# Patient Record
Sex: MALE | Race: BLACK OR AFRICAN AMERICAN | NOT HISPANIC OR LATINO | URBAN - METROPOLITAN AREA
[De-identification: names, ages, dates, MRNs, and addresses within clinical notes are randomized per-mention and may not be internally consistent; named-entity substitution may affect disease eponyms.]

---

## 2017-04-08 ENCOUNTER — INPATIENT (INPATIENT)
Facility: HOSPITAL | Age: 62
LOS: 1 days | Discharge: HOME | DRG: 300 | End: 2017-04-10
Attending: INTERNAL MEDICINE | Admitting: INTERNAL MEDICINE
Payer: COMMERCIAL

## 2017-04-08 VITALS — WEIGHT: 147.93 LBS | HEIGHT: 69 IN

## 2017-04-08 DIAGNOSIS — I24.9 ACUTE ISCHEMIC HEART DISEASE, UNSPECIFIED: ICD-10-CM

## 2017-04-08 DIAGNOSIS — I10 ESSENTIAL (PRIMARY) HYPERTENSION: ICD-10-CM

## 2017-04-08 DIAGNOSIS — E11.59 TYPE 2 DIABETES MELLITUS WITH OTHER CIRCULATORY COMPLICATIONS: ICD-10-CM

## 2017-04-08 LAB
ACETONE SERPL-MCNC: NEGATIVE — SIGNIFICANT CHANGE UP
ALBUMIN SERPL ELPH-MCNC: 4.7 G/DL — SIGNIFICANT CHANGE UP (ref 3.3–5.2)
ALP SERPL-CCNC: 106 U/L — SIGNIFICANT CHANGE UP (ref 40–120)
ALT FLD-CCNC: 28 U/L — SIGNIFICANT CHANGE UP
ANION GAP SERPL CALC-SCNC: 12 MMOL/L — SIGNIFICANT CHANGE UP (ref 5–17)
APTT BLD: 27.5 SEC — SIGNIFICANT CHANGE UP (ref 27.5–37.4)
AST SERPL-CCNC: 14 U/L — SIGNIFICANT CHANGE UP
BASOPHILS # BLD AUTO: 0 K/UL — SIGNIFICANT CHANGE UP (ref 0–0.2)
BASOPHILS NFR BLD AUTO: 0.2 % — SIGNIFICANT CHANGE UP (ref 0–2)
BILIRUB SERPL-MCNC: 1.2 MG/DL — SIGNIFICANT CHANGE UP (ref 0.4–2)
BUN SERPL-MCNC: 39 MG/DL — HIGH (ref 8–20)
CALCIUM SERPL-MCNC: 10.5 MG/DL — HIGH (ref 8.6–10.2)
CHLORIDE SERPL-SCNC: 91 MMOL/L — LOW (ref 98–107)
CK MB CFR SERPL CALC: 7.5 NG/ML — HIGH (ref 0–6.7)
CK SERPL-CCNC: 744 U/L — HIGH (ref 30–200)
CO2 SERPL-SCNC: 28 MMOL/L — SIGNIFICANT CHANGE UP (ref 22–29)
CREAT SERPL-MCNC: 1.54 MG/DL — HIGH (ref 0.5–1.3)
EOSINOPHIL # BLD AUTO: 0 K/UL — SIGNIFICANT CHANGE UP (ref 0–0.5)
EOSINOPHIL NFR BLD AUTO: 0.2 % — SIGNIFICANT CHANGE UP (ref 0–6)
GLUCOSE SERPL-MCNC: 476 MG/DL — HIGH (ref 70–115)
HCT VFR BLD CALC: 36.5 % — LOW (ref 42–52)
HGB BLD-MCNC: 12.7 G/DL — LOW (ref 14–18)
INR BLD: 0.99 RATIO — SIGNIFICANT CHANGE UP (ref 0.88–1.16)
LYMPHOCYTES # BLD AUTO: 1 K/UL — SIGNIFICANT CHANGE UP (ref 1–4.8)
LYMPHOCYTES # BLD AUTO: 25.1 % — SIGNIFICANT CHANGE UP (ref 20–55)
MCHC RBC-ENTMCNC: 28.7 PG — SIGNIFICANT CHANGE UP (ref 27–31)
MCHC RBC-ENTMCNC: 34.8 G/DL — SIGNIFICANT CHANGE UP (ref 32–36)
MCV RBC AUTO: 82.6 FL — SIGNIFICANT CHANGE UP (ref 80–94)
MONOCYTES # BLD AUTO: 0.4 K/UL — SIGNIFICANT CHANGE UP (ref 0–0.8)
MONOCYTES NFR BLD AUTO: 8.5 % — SIGNIFICANT CHANGE UP (ref 3–10)
NEUTROPHILS # BLD AUTO: 2.7 K/UL — SIGNIFICANT CHANGE UP (ref 1.8–8)
NEUTROPHILS NFR BLD AUTO: 65.8 % — SIGNIFICANT CHANGE UP (ref 37–73)
NT-PROBNP SERPL-SCNC: 35 PG/ML — SIGNIFICANT CHANGE UP (ref 0–300)
PLATELET # BLD AUTO: 174 K/UL — SIGNIFICANT CHANGE UP (ref 150–400)
POTASSIUM SERPL-MCNC: 5.2 MMOL/L — SIGNIFICANT CHANGE UP (ref 3.5–5.3)
POTASSIUM SERPL-SCNC: 5.2 MMOL/L — SIGNIFICANT CHANGE UP (ref 3.5–5.3)
PROT SERPL-MCNC: 8 G/DL — SIGNIFICANT CHANGE UP (ref 6.6–8.7)
PROTHROM AB SERPL-ACNC: 10.9 SEC — SIGNIFICANT CHANGE UP (ref 9.8–12.7)
RBC # BLD: 4.42 M/UL — LOW (ref 4.6–6.2)
RBC # FLD: 12.9 % — SIGNIFICANT CHANGE UP (ref 11–15.6)
SODIUM SERPL-SCNC: 131 MMOL/L — LOW (ref 135–145)
TROPONIN T SERPL-MCNC: <0.01 NG/ML — SIGNIFICANT CHANGE UP (ref 0–0.06)
WBC # BLD: 4.1 K/UL — LOW (ref 4.8–10.8)
WBC # FLD AUTO: 4.1 K/UL — LOW (ref 4.8–10.8)

## 2017-04-08 PROCEDURE — 71010: CPT | Mod: 26

## 2017-04-08 PROCEDURE — 99285 EMERGENCY DEPT VISIT HI MDM: CPT

## 2017-04-08 PROCEDURE — 99223 1ST HOSP IP/OBS HIGH 75: CPT

## 2017-04-08 RX ORDER — INSULIN LISPRO 100/ML
VIAL (ML) SUBCUTANEOUS AT BEDTIME
Qty: 0 | Refills: 0 | Status: DISCONTINUED | OUTPATIENT
Start: 2017-04-08 | End: 2017-04-10

## 2017-04-08 RX ORDER — ENOXAPARIN SODIUM 100 MG/ML
40 INJECTION SUBCUTANEOUS DAILY
Qty: 0 | Refills: 0 | Status: DISCONTINUED | OUTPATIENT
Start: 2017-04-08 | End: 2017-04-10

## 2017-04-08 RX ORDER — INSULIN LISPRO 100/ML
3 VIAL (ML) SUBCUTANEOUS
Qty: 0 | Refills: 0 | Status: DISCONTINUED | OUTPATIENT
Start: 2017-04-08 | End: 2017-04-10

## 2017-04-08 RX ORDER — GLUCAGON INJECTION, SOLUTION 0.5 MG/.1ML
1 INJECTION, SOLUTION SUBCUTANEOUS ONCE
Qty: 0 | Refills: 0 | Status: DISCONTINUED | OUTPATIENT
Start: 2017-04-08 | End: 2017-04-10

## 2017-04-08 RX ORDER — SODIUM CHLORIDE 9 MG/ML
1000 INJECTION INTRAMUSCULAR; INTRAVENOUS; SUBCUTANEOUS ONCE
Qty: 0 | Refills: 0 | Status: COMPLETED | OUTPATIENT
Start: 2017-04-08 | End: 2017-04-08

## 2017-04-08 RX ORDER — DEXTROSE 50 % IN WATER 50 %
1 SYRINGE (ML) INTRAVENOUS ONCE
Qty: 0 | Refills: 0 | Status: DISCONTINUED | OUTPATIENT
Start: 2017-04-08 | End: 2017-04-10

## 2017-04-08 RX ORDER — SODIUM CHLORIDE 9 MG/ML
1000 INJECTION, SOLUTION INTRAVENOUS
Qty: 0 | Refills: 0 | Status: DISCONTINUED | OUTPATIENT
Start: 2017-04-08 | End: 2017-04-10

## 2017-04-08 RX ORDER — INSULIN HUMAN 100 [IU]/ML
8 INJECTION, SOLUTION SUBCUTANEOUS ONCE
Qty: 0 | Refills: 0 | Status: COMPLETED | OUTPATIENT
Start: 2017-04-08 | End: 2017-04-08

## 2017-04-08 RX ORDER — ASPIRIN/CALCIUM CARB/MAGNESIUM 324 MG
325 TABLET ORAL ONCE
Qty: 0 | Refills: 0 | Status: COMPLETED | OUTPATIENT
Start: 2017-04-08 | End: 2017-04-08

## 2017-04-08 RX ORDER — ACETAMINOPHEN 500 MG
650 TABLET ORAL EVERY 6 HOURS
Qty: 0 | Refills: 0 | Status: DISCONTINUED | OUTPATIENT
Start: 2017-04-08 | End: 2017-04-10

## 2017-04-08 RX ORDER — SODIUM CHLORIDE 9 MG/ML
1000 INJECTION INTRAMUSCULAR; INTRAVENOUS; SUBCUTANEOUS
Qty: 0 | Refills: 0 | Status: DISCONTINUED | OUTPATIENT
Start: 2017-04-08 | End: 2017-04-10

## 2017-04-08 RX ORDER — DEXTROSE 50 % IN WATER 50 %
25 SYRINGE (ML) INTRAVENOUS ONCE
Qty: 0 | Refills: 0 | Status: DISCONTINUED | OUTPATIENT
Start: 2017-04-08 | End: 2017-04-10

## 2017-04-08 RX ORDER — SODIUM CHLORIDE 9 MG/ML
3 INJECTION INTRAMUSCULAR; INTRAVENOUS; SUBCUTANEOUS ONCE
Qty: 0 | Refills: 0 | Status: COMPLETED | OUTPATIENT
Start: 2017-04-08 | End: 2017-04-08

## 2017-04-08 RX ORDER — INSULIN LISPRO 100/ML
VIAL (ML) SUBCUTANEOUS
Qty: 0 | Refills: 0 | Status: DISCONTINUED | OUTPATIENT
Start: 2017-04-08 | End: 2017-04-10

## 2017-04-08 RX ORDER — ASPIRIN/CALCIUM CARB/MAGNESIUM 324 MG
81 TABLET ORAL DAILY
Qty: 0 | Refills: 0 | Status: DISCONTINUED | OUTPATIENT
Start: 2017-04-08 | End: 2017-04-10

## 2017-04-08 RX ORDER — DEXTROSE 50 % IN WATER 50 %
12.5 SYRINGE (ML) INTRAVENOUS ONCE
Qty: 0 | Refills: 0 | Status: DISCONTINUED | OUTPATIENT
Start: 2017-04-08 | End: 2017-04-10

## 2017-04-08 RX ORDER — INSULIN GLARGINE 100 [IU]/ML
20 INJECTION, SOLUTION SUBCUTANEOUS AT BEDTIME
Qty: 0 | Refills: 0 | Status: DISCONTINUED | OUTPATIENT
Start: 2017-04-08 | End: 2017-04-10

## 2017-04-08 RX ADMIN — Medication 325 MILLIGRAM(S): at 17:32

## 2017-04-08 RX ADMIN — SODIUM CHLORIDE 100 MILLILITER(S): 9 INJECTION INTRAMUSCULAR; INTRAVENOUS; SUBCUTANEOUS at 17:03

## 2017-04-08 RX ADMIN — SODIUM CHLORIDE 3 MILLILITER(S): 9 INJECTION INTRAMUSCULAR; INTRAVENOUS; SUBCUTANEOUS at 15:00

## 2017-04-08 RX ADMIN — INSULIN HUMAN 8 UNIT(S): 100 INJECTION, SOLUTION SUBCUTANEOUS at 17:03

## 2017-04-08 RX ADMIN — INSULIN GLARGINE 20 UNIT(S): 100 INJECTION, SOLUTION SUBCUTANEOUS at 22:52

## 2017-04-08 RX ADMIN — SODIUM CHLORIDE 1000 MILLILITER(S): 9 INJECTION INTRAMUSCULAR; INTRAVENOUS; SUBCUTANEOUS at 15:00

## 2017-04-08 NOTE — ED STATDOCS - DETAILS:
I, Gretel Linares, personally performed the services described in the documentation, reviewed the documentation recorded by the scribe in my presence and it accurately and completely records my words and action.

## 2017-04-08 NOTE — ED PROVIDER NOTE - OBJECTIVE STATEMENT
62 y/o male in ED c/o weakness with sob and cp intermittent x 2 wks.  pt states had has insulin changed 2 wks ago and BS has been elevated.  pt denies any fever, HA, n/v/d/abd pain.  tolerating PO.  no sick contacts or recent travel

## 2017-04-08 NOTE — H&P ADULT - PROBLEM SELECTOR PLAN 3
Monitor BP  He doesn't remember his outpatient medications.  Consider ace inhibitor as he has diabetes.

## 2017-04-08 NOTE — ED ADULT NURSE REASSESSMENT NOTE - NS ED NURSE REASSESS COMMENT FT1
Report received from off going RN, charting as noted. Patient A&Ox4, denies any pain or discomfort, cardiac monitor in place, NSR. Respirations even & unlabored. IV site C/D/I, patent. Negative s/s phlebitis or infiltration. Full ROM x4, will continue to monitor.

## 2017-04-08 NOTE — ED ADULT TRIAGE NOTE - CHIEF COMPLAINT QUOTE
patient arrived ambulatory to ED, awake, alert, and oriented times 3, breathing unlabored.  Patient was sent by Dr. Burns due to weakness and fatigue.  Patient states symptoms have been present for 2 weeks.  Patient also states SOB on exertion.  No complaints of pain at this time

## 2017-04-08 NOTE — H&P ADULT - HISTORY OF PRESENT ILLNESS
61 year old male with PMH HTN, DM. His Lantus was changed to Toujeo and his glucose levels were elevated. Patient was in his usual state of health when he developed left sided chest pain on exertion relieved at rest.  In the er his glucose was elevated and his cpk was elevated as well but troponin was marco antonio. EKG sinus non specific st changes. He does not remember his medications.

## 2017-04-08 NOTE — H&P ADULT - FAMILY HISTORY
Mother  Still living? Unknown  Family history of lung cancer, Age at diagnosis: Age Unknown     Father  Still living? Unknown  Family history of alcoholism, Age at diagnosis: Age Unknown

## 2017-04-08 NOTE — ED ADULT NURSE REASSESSMENT NOTE - NS ED NURSE REASSESS COMMENT FT1
pt resitng comfortably in cart. breathing si even and unlabored. pt admitted. pt awaiting bed availability on floor. will continu to monitor and reassess

## 2017-04-08 NOTE — ED STATDOCS - PROGRESS NOTE DETAILS
60 y/o M with hx of HTN presents to the ED c/o weakness/fatigue today. Pt notes he has a very high sugar level, 475 checked at PMD's office. He states his medication was changed 2 weeks ago because MD claimed that it was not helping him. He notes associated exertional dyspnea, L-sided chest congestion as well. Denies fever, chills, dysuria, pedal edema, calf tenderness. No further complaints at this time. Pt with fatigue high blood sugars, exertional dyspnea and sensation of congestion on L side of chest to be moved to main ED for complete evaluation by another provider.   PE: Lungs clear, no calf tenderness.  PMD is Dr. Pulliam.

## 2017-04-09 LAB
ANION GAP SERPL CALC-SCNC: 8 MMOL/L — SIGNIFICANT CHANGE UP (ref 5–17)
BUN SERPL-MCNC: 25 MG/DL — HIGH (ref 8–20)
CALCIUM SERPL-MCNC: 9.5 MG/DL — SIGNIFICANT CHANGE UP (ref 8.6–10.2)
CHLORIDE SERPL-SCNC: 100 MMOL/L — SIGNIFICANT CHANGE UP (ref 98–107)
CHOLEST SERPL-MCNC: 173 MG/DL — SIGNIFICANT CHANGE UP (ref 110–199)
CK MB CFR SERPL CALC: 5.1 NG/ML — SIGNIFICANT CHANGE UP (ref 0–6.7)
CK MB CFR SERPL CALC: 5.5 NG/ML — SIGNIFICANT CHANGE UP (ref 0–6.7)
CK SERPL-CCNC: 415 U/L — HIGH (ref 30–200)
CK SERPL-CCNC: 484 U/L — HIGH (ref 30–200)
CO2 SERPL-SCNC: 29 MMOL/L — SIGNIFICANT CHANGE UP (ref 22–29)
CREAT SERPL-MCNC: 0.99 MG/DL — SIGNIFICANT CHANGE UP (ref 0.5–1.3)
GLUCOSE SERPL-MCNC: 243 MG/DL — HIGH (ref 70–115)
HBA1C BLD-MCNC: 12.9 % — HIGH (ref 4–5.6)
HCT VFR BLD CALC: 33.8 % — LOW (ref 42–52)
HDLC SERPL-MCNC: 71 MG/DL — SIGNIFICANT CHANGE UP
HGB BLD-MCNC: 11.6 G/DL — LOW (ref 14–18)
LIPID PNL WITH DIRECT LDL SERPL: 91 MG/DL — SIGNIFICANT CHANGE UP
MAGNESIUM SERPL-MCNC: 2.1 MG/DL — SIGNIFICANT CHANGE UP (ref 1.8–2.5)
MCHC RBC-ENTMCNC: 28.6 PG — SIGNIFICANT CHANGE UP (ref 27–31)
MCHC RBC-ENTMCNC: 34.3 G/DL — SIGNIFICANT CHANGE UP (ref 32–36)
MCV RBC AUTO: 83.3 FL — SIGNIFICANT CHANGE UP (ref 80–94)
PHOSPHATE SERPL-MCNC: 2.6 MG/DL — SIGNIFICANT CHANGE UP (ref 2.4–4.7)
PLATELET # BLD AUTO: 151 K/UL — SIGNIFICANT CHANGE UP (ref 150–400)
POTASSIUM SERPL-MCNC: 3.9 MMOL/L — SIGNIFICANT CHANGE UP (ref 3.5–5.3)
POTASSIUM SERPL-SCNC: 3.9 MMOL/L — SIGNIFICANT CHANGE UP (ref 3.5–5.3)
RBC # BLD: 4.06 M/UL — LOW (ref 4.6–6.2)
RBC # FLD: 13 % — SIGNIFICANT CHANGE UP (ref 11–15.6)
SODIUM SERPL-SCNC: 137 MMOL/L — SIGNIFICANT CHANGE UP (ref 135–145)
TOTAL CHOLESTEROL/HDL RATIO MEASUREMENT: 2 RATIO — LOW (ref 3.4–9.6)
TRIGL SERPL-MCNC: 57 MG/DL — SIGNIFICANT CHANGE UP (ref 10–200)
TSH SERPL-MCNC: 1.05 UIU/ML — SIGNIFICANT CHANGE UP (ref 0.27–4.2)
WBC # BLD: 3.1 K/UL — LOW (ref 4.8–10.8)
WBC # FLD AUTO: 3.1 K/UL — LOW (ref 4.8–10.8)

## 2017-04-09 PROCEDURE — 93306 TTE W/DOPPLER COMPLETE: CPT | Mod: 26

## 2017-04-09 PROCEDURE — 99233 SBSQ HOSP IP/OBS HIGH 50: CPT

## 2017-04-09 RX ADMIN — Medication 2: at 09:30

## 2017-04-09 RX ADMIN — ENOXAPARIN SODIUM 40 MILLIGRAM(S): 100 INJECTION SUBCUTANEOUS at 12:23

## 2017-04-09 RX ADMIN — Medication 4: at 21:42

## 2017-04-09 RX ADMIN — Medication 3 UNIT(S): at 09:30

## 2017-04-09 RX ADMIN — INSULIN GLARGINE 20 UNIT(S): 100 INJECTION, SOLUTION SUBCUTANEOUS at 21:28

## 2017-04-09 RX ADMIN — SODIUM CHLORIDE 100 MILLILITER(S): 9 INJECTION INTRAMUSCULAR; INTRAVENOUS; SUBCUTANEOUS at 06:24

## 2017-04-09 RX ADMIN — Medication 81 MILLIGRAM(S): at 12:24

## 2017-04-10 VITALS
DIASTOLIC BLOOD PRESSURE: 72 MMHG | SYSTOLIC BLOOD PRESSURE: 128 MMHG | OXYGEN SATURATION: 98 % | TEMPERATURE: 98 F | HEART RATE: 90 BPM | RESPIRATION RATE: 16 BRPM

## 2017-04-10 PROCEDURE — 78452 HT MUSCLE IMAGE SPECT MULT: CPT | Mod: 26

## 2017-04-10 PROCEDURE — 71045 X-RAY EXAM CHEST 1 VIEW: CPT

## 2017-04-10 PROCEDURE — 80061 LIPID PANEL: CPT

## 2017-04-10 PROCEDURE — 85027 COMPLETE CBC AUTOMATED: CPT

## 2017-04-10 PROCEDURE — 83036 HEMOGLOBIN GLYCOSYLATED A1C: CPT

## 2017-04-10 PROCEDURE — 83880 ASSAY OF NATRIURETIC PEPTIDE: CPT

## 2017-04-10 PROCEDURE — 96374 THER/PROPH/DIAG INJ IV PUSH: CPT

## 2017-04-10 PROCEDURE — 80053 COMPREHEN METABOLIC PANEL: CPT

## 2017-04-10 PROCEDURE — 93005 ELECTROCARDIOGRAM TRACING: CPT

## 2017-04-10 PROCEDURE — 80048 BASIC METABOLIC PNL TOTAL CA: CPT

## 2017-04-10 PROCEDURE — 84100 ASSAY OF PHOSPHORUS: CPT

## 2017-04-10 PROCEDURE — 84443 ASSAY THYROID STIM HORMONE: CPT

## 2017-04-10 PROCEDURE — 85610 PROTHROMBIN TIME: CPT

## 2017-04-10 PROCEDURE — 78452 HT MUSCLE IMAGE SPECT MULT: CPT

## 2017-04-10 PROCEDURE — 93017 CV STRESS TEST TRACING ONLY: CPT

## 2017-04-10 PROCEDURE — 36415 COLL VENOUS BLD VENIPUNCTURE: CPT

## 2017-04-10 PROCEDURE — 93016 CV STRESS TEST SUPVJ ONLY: CPT

## 2017-04-10 PROCEDURE — 82550 ASSAY OF CK (CPK): CPT

## 2017-04-10 PROCEDURE — 85730 THROMBOPLASTIN TIME PARTIAL: CPT

## 2017-04-10 PROCEDURE — A9500: CPT

## 2017-04-10 PROCEDURE — 93018 CV STRESS TEST I&R ONLY: CPT

## 2017-04-10 PROCEDURE — 82009 KETONE BODYS QUAL: CPT

## 2017-04-10 PROCEDURE — 99285 EMERGENCY DEPT VISIT HI MDM: CPT | Mod: 25

## 2017-04-10 PROCEDURE — 83735 ASSAY OF MAGNESIUM: CPT

## 2017-04-10 PROCEDURE — 99239 HOSP IP/OBS DSCHRG MGMT >30: CPT

## 2017-04-10 PROCEDURE — 84484 ASSAY OF TROPONIN QUANT: CPT

## 2017-04-10 PROCEDURE — 93306 TTE W/DOPPLER COMPLETE: CPT

## 2017-04-10 PROCEDURE — 82553 CREATINE MB FRACTION: CPT

## 2017-04-10 RX ORDER — INSULIN LISPRO 100/ML
3 VIAL (ML) SUBCUTANEOUS
Qty: 1 | Refills: 0 | OUTPATIENT
Start: 2017-04-10 | End: 2017-05-10

## 2017-04-10 RX ORDER — ASPIRIN/CALCIUM CARB/MAGNESIUM 324 MG
1 TABLET ORAL
Qty: 0 | Refills: 0 | COMMUNITY
Start: 2017-04-10

## 2017-04-10 RX ORDER — ATORVASTATIN CALCIUM 80 MG/1
1 TABLET, FILM COATED ORAL
Qty: 30 | Refills: 0 | OUTPATIENT
Start: 2017-04-10 | End: 2017-05-10

## 2017-04-10 RX ORDER — LOSARTAN POTASSIUM 100 MG/1
1 TABLET, FILM COATED ORAL
Qty: 30 | Refills: 0 | OUTPATIENT
Start: 2017-04-10 | End: 2017-05-10

## 2017-04-10 RX ORDER — DEXTROSE 50 % IN WATER 50 %
12.5 SYRINGE (ML) INTRAVENOUS ONCE
Qty: 0 | Refills: 0 | Status: COMPLETED | OUTPATIENT
Start: 2017-04-10 | End: 2017-04-10

## 2017-04-10 RX ORDER — INSULIN GLARGINE 100 [IU]/ML
20 INJECTION, SOLUTION SUBCUTANEOUS
Qty: 0 | Refills: 0 | COMMUNITY

## 2017-04-10 RX ADMIN — Medication 12.5 GRAM(S): at 09:32

## 2017-04-10 RX ADMIN — Medication 3 UNIT(S): at 13:29

## 2017-04-10 RX ADMIN — Medication 81 MILLIGRAM(S): at 11:36

## 2017-04-10 NOTE — CONSULT NOTE ADULT - PROBLEM SELECTOR RECOMMENDATION 9
Patient's symptoms concerning for cardiac etiology. CPK elevated but troponins negative. Will repeat. Aspirin 81 mg po. Will most likely plan for nuclear stress test on monday if troponins continue to be negative.
insulin   diabetes education

## 2017-04-10 NOTE — DISCHARGE NOTE ADULT - SECONDARY DIAGNOSIS.
Essential hypertension Type 2 diabetes mellitus with other circulatory complication Mixed hyperlipidemia

## 2017-04-10 NOTE — DISCHARGE NOTE ADULT - PLAN OF CARE
Stable cardiac function Follow up with Primary Care Add cozaar  Follow up Primary Care Resume Toujeo 20 units at 10 pm, add Humalog 3 units pre meals, watch for hypoglycemia,  Follow up with Dr Purcell. Ad lipitor 10 mg daily  Assess liver enzymes in one month,  watch for muscle aches.

## 2017-04-10 NOTE — PROGRESS NOTE ADULT - PROBLEM SELECTOR PROBLEM 2
Type 2 diabetes mellitus with other circulatory complication
Essential hypertension
Type 2 diabetes mellitus with other circulatory complication

## 2017-04-10 NOTE — PROGRESS NOTE ADULT - PROBLEM SELECTOR PLAN 2
Hypoglycemic today  DM education
stable.
Continue Dm education  Lantus and Humalog  Consult Endocrinology

## 2017-04-10 NOTE — DISCHARGE NOTE ADULT - CARE PLAN
Principal Discharge DX:	ACS (acute coronary syndrome)  Goal:	Stable cardiac function  Instructions for follow-up, activity and diet:	Follow up with Primary Care  Secondary Diagnosis:	Essential hypertension  Instructions for follow-up, activity and diet:	Add cozaar  Follow up Primary Care  Secondary Diagnosis:	Type 2 diabetes mellitus with other circulatory complication  Instructions for follow-up, activity and diet:	Resume Toujeo 20 units at 10 pm, add Humalog 3 units pre meals, watch for hypoglycemia,  Follow up with Dr Purcell.  Secondary Diagnosis:	Mixed hyperlipidemia  Instructions for follow-up, activity and diet:	Ad lipitor 10 mg daily  Assess liver enzymes in one month,  watch for muscle aches.

## 2017-04-10 NOTE — DISCHARGE NOTE ADULT - PATIENT PORTAL LINK FT
“You can access the Follow[employer] Patient Portal, offered by [facility], by registering with the following website: [email or web address]”

## 2017-04-10 NOTE — DISCHARGE NOTE ADULT - CARE PROVIDER_API CALL
Salinas Purcell), Internal Medicine  [address] [place] [address]  Phone: [phone]  Fax: [phone]    Dr Brian  Phone: (   )    -  Fax: (   )    -

## 2017-04-10 NOTE — DISCHARGE NOTE ADULT - MEDICATION SUMMARY - MEDICATIONS TO TAKE
I will START or STAY ON the medications listed below when I get home from the hospital:    Patient was admitted to [facility] on 4/8/2017 and discharged on 4/10/2017. He is stable to return to work.  -- Indication: For ACS (acute coronary syndrome)    relion glucose meter  -- Indication: For Type 2 diabetes mellitus with other circulatory complication    relion glucose test strips  -- Indication: For Type 2 diabetes mellitus with other circulatory complication    relion lancets  -- Indication: For Type 2 diabetes mellitus with other circulatory complication    glucose tablets prn hypoglycemia  -- Indication: For Type 2 diabetes mellitus with other circulatory complication    aspirin 81 mg oral tablet, chewable  -- 1 tab(s) by mouth once a day  -- Indication: For ACS (acute coronary syndrome)    Cozaar 25 mg oral tablet  -- 1 tab(s) by mouth once a day  -- Do not take this drug if you are pregnant.  It is very important that you take or use this exactly as directed.  Do not skip doses or discontinue unless directed by your doctor.  Some non-prescription drugs may aggravate your condition.  Read all labels carefully.  If a warning appears, check with your doctor before taking.    -- Indication: For Proteinuria    Toujeo SoloStar 300 units/mL subcutaneous solution  -- 20 unit(s) subcutaneous once a day (at bedtime)  -- Indication: For Type 2 diabetes mellitus with other circulatory complication    HumaLOG KwikPen 100 units/mL subcutaneous solution  -- 3 unit(s) subcutaneous 3 times a day (before meals)  -- Indication: For Type 2 diabetes mellitus with other circulatory complication    atorvastatin 10 mg oral tablet  -- 1 tab(s) by mouth once a day  -- Avoid grapefruit and grapefruit juice while taking this medication.  Do not take this drug if you are pregnant.  It is very important that you take or use this exactly as directed.  Do not skip doses or discontinue unless directed by your doctor.  Obtain medical advice before taking any non-prescription drugs as some may affect the action of this medication.  Take with food or milk.    -- Indication: For Hyperlycemia

## 2017-04-10 NOTE — DISCHARGE NOTE ADULT - HOSPITAL COURSE
61 year old male with PMH HTN, DM. His Lantus was changed to Toujeo and his glucose levels were elevated. Patient was in his usual state of health when he developed left sided chest pain on exertion relieved at rest.  In the er his glucose was elevated and his cpk was elevated as well but troponin was marco antonio. EKG sinus non specific st changes. He does not remember his medications.  Patient was admitted and educated on diabetes. I taught him the importance of humalog premeal in addition to his Toujeo basal.  Target glucose between 100-150. He has an appointment with Dr Purcell.  His stress test was normal and the patient was cleared for discharge with close outpatient follow up.

## 2017-04-10 NOTE — PROGRESS NOTE ADULT - SUBJECTIVE AND OBJECTIVE BOX
CC: chest pain    INTERVAL HISTORY: Improved symptoms of CP.     MEDICATIONS  (STANDING):  sodium chloride 0.9%. 1000milliLiter(s) IV Continuous <Continuous>  insulin glargine Injectable (LANTUS) 20Unit(s) SubCutaneous at bedtime  insulin lispro Injectable (HumaLOG) 3Unit(s) SubCutaneous three times a day before meals  insulin lispro (HumaLOG) corrective regimen sliding scale  SubCutaneous three times a day before meals  insulin lispro (HumaLOG) corrective regimen sliding scale  SubCutaneous at bedtime  dextrose 5%. 1000milliLiter(s) IV Continuous <Continuous>  dextrose 50% Injectable 12.5Gram(s) IV Push once  dextrose 50% Injectable 25Gram(s) IV Push once  dextrose 50% Injectable 25Gram(s) IV Push once  enoxaparin Injectable 40milliGRAM(s) SubCutaneous daily  aspirin  chewable 81milliGRAM(s) Oral daily    ROS: All others negative     PHYSICAL EXAM:  T(C): 36.9, Max: 36.9 (04-08 @ 20:54)  HR: 87 (80 - 106)  BP: 110/65 (103/56 - 135/81)  RR: 18 (18 - 20)  SpO2: 100% (98% - 100%)  Wt(kg): --  I&O's Summary    I & Os for current day (as of 09 Apr 2017 11:58)  =============================================  IN: 1000 ml / OUT: 0 ml / NET: 1000 ml      Appearance: Normal	  HEENT:   Normal oral mucosa, PERRL, EOMI	  Lymphatic: No lymphadenopathy  Cardiovascular: Normal S1 S2, No JVD, No murmurs, No edema  Respiratory: Lungs clear to auscultation	  Psychiatry: A & O x 3, Mood & affect appropriate  Gastrointestinal:  Soft, Non-tender, + BS	  Skin: No rashes, No ecchymoses, No cyanosis  Neurologic: Non-focal  Extremities: Normal range of motion, No clubbing, cyanosis or edema  Vascular: Peripheral pulses palpable 2+ bilaterally        LABS:	 	                        11.6   3.1   )-----------( 151      ( 09 Apr 2017 06:35 )             33.8     04-09    137  |  100  |  25.0<H>  ----------------------------<  243<H>  3.9   |  29.0  |  0.99    Ca    9.5      09 Apr 2017 06:35  Phos  2.6     04-09  Mg     2.1     04-09    TPro  8.0  /  Alb  4.7  /  TBili  1.2  /  DBili  x   /  AST  14  /  ALT  28  /  AlkPhos  106  04-08
REYMUNDO RUGGIERO     Chief Complaint: Patient is a 61y old  Male who presents with a chief complaint of Chest Pain (08 Apr 2017 17:45)      PAST MEDICAL & SURGICAL HISTORY:  Essential hypertension  Type 2 diabetes mellitus with other circulatory complication  No significant past surgical history      HPI/OVERNIGHT EVENTS: Patient doing well. Stress test in am.    MEDICATIONS  (STANDING):  sodium chloride 0.9%. 1000milliLiter(s) IV Continuous <Continuous>  insulin glargine Injectable (LANTUS) 20Unit(s) SubCutaneous at bedtime  insulin lispro Injectable (HumaLOG) 3Unit(s) SubCutaneous three times a day before meals  insulin lispro (HumaLOG) corrective regimen sliding scale  SubCutaneous three times a day before meals  insulin lispro (HumaLOG) corrective regimen sliding scale  SubCutaneous at bedtime  dextrose 5%. 1000milliLiter(s) IV Continuous <Continuous>  dextrose 50% Injectable 12.5Gram(s) IV Push once  dextrose 50% Injectable 25Gram(s) IV Push once  dextrose 50% Injectable 25Gram(s) IV Push once  enoxaparin Injectable 40milliGRAM(s) SubCutaneous daily  aspirin  chewable 81milliGRAM(s) Oral daily      Vital Signs Last 24 Hrs  T(C): 36.9, Max: 36.9 (04-08 @ 20:54)  T(F): 98.5, Max: 98.5 (04-08 @ 20:54)  HR: 87 (80 - 87)  BP: 110/65 (103/56 - 135/81)  BP(mean): --  RR: 18 (18 - 20)  SpO2: 100% (98% - 100%)    PHYSICAL EXAM:  Constitutional: NAD, well-groomed, well-developed  HEENT: PERRLA, EOMI, Normal Hearing, MMM  Neck: No LAD, No JVD  Back: Normal spine flexure, No CVA tenderness  Respiratory: CTAB Cardiovascular: S1 and S2, RRR, no M/G/R  Gastrointestinal: BS+, soft, NT/ND  Extremities: No peripheral edema  Vascular: 2+ peripheral pulses  Neurological: A/O x 3, no focal deficits  Psychiatric: Normal mood, normal affect  Musculoskeletal: 5/5 strength b/l upper and lower extremities  Skin: No rashes    CAPILLARY BLOOD GLUCOSE  95 (09 Apr 2017 12:31)  166 (09 Apr 2017 08:32)  121 (08 Apr 2017 22:01)  121 (08 Apr 2017 18:36)  396 (08 Apr 2017 14:59)    LABS:                        11.6   3.1   )-----------( 151      ( 09 Apr 2017 06:35 )             33.8     04-09    137  |  100  |  25.0<H>  ----------------------------<  243<H>  3.9   |  29.0  |  0.99    Ca    9.5      09 Apr 2017 06:35  Phos  2.6     04-09  Mg     2.1     04-09    TPro  8.0  /  Alb  4.7  /  TBili  1.2  /  DBili  x   /  AST  14  /  ALT  28  /  AlkPhos  106  04-08    PT/INR - ( 08 Apr 2017 15:23 )   PT: 10.9 sec;   INR: 0.99 ratio         PTT - ( 08 Apr 2017 15:23 )  PTT:27.5 sec      RADIOLOGY & ADDITIONAL TESTS:
REYMUNDO RUGGIERO     Chief Complaint: Patient is a 61y old  Male who presents with a chief complaint of Chest Pain (08 Apr 2017 17:45)      PAST MEDICAL & SURGICAL HISTORY:  Essential hypertension  Type 2 diabetes mellitus with other circulatory complication  No significant past surgical history      HPI/OVERNIGHT EVENTS: Stress test today    MEDICATIONS  (STANDING):  sodium chloride 0.9%. 1000milliLiter(s) IV Continuous <Continuous>  insulin glargine Injectable (LANTUS) 20Unit(s) SubCutaneous at bedtime  insulin lispro Injectable (HumaLOG) 3Unit(s) SubCutaneous three times a day before meals  insulin lispro (HumaLOG) corrective regimen sliding scale  SubCutaneous three times a day before meals  insulin lispro (HumaLOG) corrective regimen sliding scale  SubCutaneous at bedtime  dextrose 5%. 1000milliLiter(s) IV Continuous <Continuous>  dextrose 50% Injectable 12.5Gram(s) IV Push once  dextrose 50% Injectable 25Gram(s) IV Push once  dextrose 50% Injectable 25Gram(s) IV Push once  enoxaparin Injectable 40milliGRAM(s) SubCutaneous daily  aspirin  chewable 81milliGRAM(s) Oral daily      Vital Signs Last 24 Hrs  T(C): 36.9, Max: 37 (04-10 @ 05:13)  T(F): 98.4, Max: 98.6 (04-10 @ 05:13)  HR: 91 (80 - 94)  BP: 100/70 (100/70 - 144/72)  BP(mean): --  RR: 16 (14 - 20)  SpO2: 98% (96% - 100%)    PHYSICAL EXAM:  Constitutional: NAD, well-groomed, well-developed  HEENT: PERRLA, EOMI, Normal Hearing, MMM  Neck: No LAD, No JVD  Back: Normal spine flexure, No CVA tenderness  Respiratory: CTAB Cardiovascular: S1 and S2, RRR, no M/G/R  Gastrointestinal: BS+, soft, NT/ND  Extremities: No peripheral edema  Vascular: 2+ peripheral pulses  Neurological: A/O x 3, no focal deficits  Psychiatric: Normal mood, normal affect  Musculoskeletal: 5/5 strength b/l upper and lower extremities  Skin: No rashes    CAPILLARY BLOOD GLUCOSE  92 (10 Apr 2017 11:05)  126 (10 Apr 2017 09:49)  68 (10 Apr 2017 09:25)  76 (10 Apr 2017 06:57)  309 (09 Apr 2017 21:22)  285 (09 Apr 2017 17:31)  95 (09 Apr 2017 12:31)  166 (09 Apr 2017 08:32)  121 (08 Apr 2017 22:01)  121 (08 Apr 2017 18:36)  396 (08 Apr 2017 14:59)    LABS:                        11.6   3.1   )-----------( 151      ( 09 Apr 2017 06:35 )             33.8     04-09    137  |  100  |  25.0<H>  ----------------------------<  243<H>  3.9   |  29.0  |  0.99    Ca    9.5      09 Apr 2017 06:35  Phos  2.6     04-09  Mg     2.1     04-09    TPro  8.0  /  Alb  4.7  /  TBili  1.2  /  DBili  x   /  AST  14  /  ALT  28  /  AlkPhos  106  04-08    PT/INR - ( 08 Apr 2017 15:23 )   PT: 10.9 sec;   INR: 0.99 ratio         PTT - ( 08 Apr 2017 15:23 )  PTT:27.5 sec      RADIOLOGY & ADDITIONAL TESTS:

## 2017-04-10 NOTE — CONSULT NOTE ADULT - ASSESSMENT
T2DM - uncontrolled [id number] 12.9 - will continue current RX for now as cardiac work up in progress  switch to Toujeo not likley casue of symptomatology   with Lantus and Humalog- pt will see us as outpt

## 2017-04-10 NOTE — PROGRESS NOTE ADULT - PROBLEM SELECTOR PLAN 1
Stress test today
Symptoms unclear. No EKG changes and normal cardiac enzymes. plan for nuclear stress in am. npo after midnight.
Stress test in am

## 2017-04-10 NOTE — CONSULT NOTE ADULT - SUBJECTIVE AND OBJECTIVE BOX
HPI:  61 year old male with PMH HTN, DM x 22 years  pt had noted onset of hyperglcyemia about 2 months when hisLantus was changed to Toujeo. Pt noted onset of some shortness of breath which he attributed to his high BS .  Patient was in his usual state of health when he developed left sided chest pain on exertion relieved at rest.  In the er his glucose was elevated and his cpk was elevated as well but troponin was marco antonio. EKG sinus non specific st changes. He does not remember his medications. (08 Apr 2017 17:45)      PAST MEDICAL & SURGICAL HISTORY:  Essential hypertension  Type 2 diabetes mellitus with other circulatory complication  No significant past surgical history      FAMILY HISTORY:  Family history of alcoholism (Father)  Family history of lung cancer (Mother)  Mom and maternal uncles with DM       SOCIAL HISTORY: former smoker and alcohol use but quit several years ago    no illicit drugs   No exposure to chemcial or radiation  Born and raised in [facility]     REVIEW OF SYSTEMS:    Constitutional: No fever, no chills, no change in weight.    Eyes: No eye swelling,no  blurry vision, no redness, no loss of vision.    Neck: No neck pain, no change in voice.    Lungs: No shortness of breath, no wheezing, no cough    CV: No chest pain, no palpitations, no pain with walking.    GI: No nausea, no vomiting, no constipation, no diarrhea, no abdominal pain    : No urinary frequency, no blood in urine, no urinary burning, no difficulty voiding.    Musculoskeletal: No muscle pain, no joint pain, no swelling.    Skin: No rash, no infections.    Neurologic: No headaches, no weakness, no burning or pain in feet, no tremor.    Endocrine: No heat intolerance, no cold intolerance, no increased sweating, no shakiness between meals.    Psych: No depression, no anxiety, no trouble concentrating    MEDICATIONS  (STANDING):  sodium chloride 0.9%. 1000milliLiter(s) IV Continuous <Continuous>  insulin glargine Injectable (LANTUS) 20Unit(s) SubCutaneous at bedtime  insulin lispro Injectable (HumaLOG) 3Unit(s) SubCutaneous three times a day before meals  insulin lispro (HumaLOG) corrective regimen sliding scale  SubCutaneous three times a day before meals  insulin lispro (HumaLOG) corrective regimen sliding scale  SubCutaneous at bedtime  dextrose 5%. 1000milliLiter(s) IV Continuous <Continuous>  dextrose 50% Injectable 12.5Gram(s) IV Push once  dextrose 50% Injectable 25Gram(s) IV Push once  dextrose 50% Injectable 25Gram(s) IV Push once  enoxaparin Injectable 40milliGRAM(s) SubCutaneous daily  aspirin  chewable 81milliGRAM(s) Oral daily    MEDICATIONS  (PRN):  dextrose Gel 1Dose(s) Oral once PRN Blood Glucose LESS THAN 70 milliGRAM(s)/deciliter  glucagon  Injectable 1milliGRAM(s) IntraMuscular once PRN Glucose LESS THAN 70 milligrams/deciliter  acetaminophen   Tablet. 650milliGRAM(s) Oral every 6 hours PRN Mild Pain (1 - 3)      Allergies    No Known Allergies    Intolerances          CAPILLARY BLOOD GLUCOSE  92 (10 Apr 2017 11:05)      PHYSICAL EXAM:    Vital Signs Last 24 Hrs  T(C): 36.8, Max: 37 (04-10 @ 05:13)  T(F): 98.2, Max: 98.6 (04-10 @ 05:13)  HR: 90 (85 - 94)  BP: 128/72 (100/70 - 144/72)  BP(mean): --  RR: 16 (16 - 16)  SpO2: 98% (96% - 98%)    General appearance: Well developed, well nourished.    Eyes: Pupils equal and reactive to light. EOM full. No exophthalmos.    Neck: Trachea midline. No thyroid enlargement.    Lungs: Normal respiratory excursion. Lungs clear.    CV: Regular cardiac rhythm. No murmur. Carotid and pedal pulses intact.      Musculoskeletal: No cyanosis, clubbing, or edema. No pedal lesions.    Skin: Warm and moist. No rash. No acanthosis.    Neuro: Cranial nerves intact. Normal motor and sensory function. DTR's normal.    Psych: Normal affect, good judgement.            LABS:                        11.6   3.1   )-----------( 151      ( 09 Apr 2017 06:35 )             33.8     04-09    137  |  100  |  25.0<H>  ----------------------------<  243<H>  3.9   |  29.0  |  0.99    Ca    9.5      09 Apr 2017 06:35  Phos  2.6     04-09  Mg     2.1     04-09          Hemoglobin A1C, Whole Blood: 12.9 % (04-09 @ 06:35)        CAPILLARY BLOOD GLUCOSE  92 (10 Apr 2017 11:05)  126 (10 Apr 2017 09:49)  68 (10 Apr 2017 09:25)  76 (10 Apr 2017 06:57)  309 (09 Apr 2017 21:22)  285 (09 Apr 2017 17:31)  95 (09 Apr 2017 12:31)  166 (09 Apr 2017 08:32)  121 (08 Apr 2017 22:01)  121 (08 Apr 2017 18:36)  396 (08 Apr 2017 14:59)      RADIOLOGY & ADDITIONAL STUDIES:
CC: Chest pain    HPI: Patient is a  61y Male with history of hypertension, DMII presenting with chest pain. Patient has had recent change in his DM medications. Has been noticing increased symptoms of chest pain with and without exertion. Denies n/v/d. No previous cardiac workup.   Has had mild shortness of breath.       PAST MEDICAL & SURGICAL HISTORY:  Essential hypertension  Type 2 diabetes mellitus with other circulatory complication  No significant past surgical history    FAMILY HISTORY:  Family history of alcoholism (Father)  Family history of lung cancer (Mother)    SOCIAL HISTORY: no EtOH, drugs or tobacco    MEDICATIONS  (STANDING):  sodium chloride 0.9%. 1000milliLiter(s) IV Continuous <Continuous>  insulin glargine Injectable (LANTUS) 20Unit(s) SubCutaneous at bedtime  insulin lispro Injectable (HumaLOG) 3Unit(s) SubCutaneous three times a day before meals  insulin lispro (HumaLOG) corrective regimen sliding scale  SubCutaneous three times a day before meals  insulin lispro (HumaLOG) corrective regimen sliding scale  SubCutaneous at bedtime  dextrose 5%. 1000milliLiter(s) IV Continuous <Continuous>  dextrose 50% Injectable 12.5Gram(s) IV Push once  dextrose 50% Injectable 25Gram(s) IV Push once  dextrose 50% Injectable 25Gram(s) IV Push once  enoxaparin Injectable 40milliGRAM(s) SubCutaneous daily    ROS: All others negative    PHYSICAL EXAM:  Vital Signs Last 24 Hrs  T(C): 36.9, Max: 36.9 (04-08 @ 20:54)  T(F): 98.5, Max: 98.5 (04-08 @ 20:54)  HR: 85 (85 - 106)  BP: 135/81 (113/71 - 135/81)  BP(mean): --  RR: 18 (18 - 18)  SpO2: 98% (98% - 99%)  I&O's Summary    Appearance: Normal	  HEENT:   Normal oral mucosa, PERRL, EOMI	  Lymphatic: No lymphadenopathy  Cardiovascular: Normal S1 S2, No JVD, No murmurs, No edema  Respiratory: Lungs clear to auscultation	  Psychiatry: A & O x 3, Mood & affect appropriate  Gastrointestinal:  Soft, Non-tender, + BS	  Skin: No rashes, No ecchymoses, No cyanosis  Neurologic: Non-focal  Extremities: Normal range of motion, No clubbing, cyanosis or edema  Vascular: Peripheral pulses palpable 2+ bilaterally    ECG:Sinus with LVH.   LABS:                        12.7   4.1   )-----------( 174      ( 08 Apr 2017 15:23 )             36.5     04-08    131<L>  |  91<L>  |  39.0<H>  ----------------------------<  476<H>  5.2   |  28.0  |  1.54<H>    Ca    10.5<H>      08 Apr 2017 15:23    TPro  8.0  /  Alb  4.7  /  TBili  1.2  /  DBili  x   /  AST  14  /  ALT  28  /  AlkPhos  106  04-08    PT/INR - ( 08 Apr 2017 15:23 )   PT: 10.9 sec;   INR: 0.99 ratio         PTT - ( 08 Apr 2017 15:23 )  PTT:27.5 sec  CARDIAC MARKERS ( 08 Apr 2017 15:23 )  x     / <0.01 ng/mL / 744 U/L / x     / 7.5 ng/mL      RADIOLOGY & ADDITIONAL STUDIES: X-ray - normal.

## 2018-01-15 NOTE — ED ADULT TRIAGE NOTE - ARRIVAL FROM
Indication:   CHEST PAIN

 

 CONCLUSIONS

 The left ventricular systolic function is normal with an estimated ejection fraction in the range of
 55-60%. 

 Normal left ventricular size. 

 Moderate concentric left ventricular hypertrophy. 

 No regional wall motion abnormalities are present. 

 Trace mitral valve regurgitation. 

 There is trace tricuspid valve regurgitation. 

 The estimated pulmonary arterial pressure is 43.4 mmHg. 

 Trivial pulmonary valve regurgitation. 

 

 BP:  167   / 87      HR: 95                       Rhythm:           Sinus

 

 MEASUREMENTS  (Male / Female) Normal Values       Technical Quality:Good

 2D ECHO

 LV Diastolic Diameter PLAX        3.9 cm                4.2 - 5.9 / 3.9 - 5.3 cm

 LV Systolic Diameter PLAX         2.8 cm                

 IVS Diastolic Thickness           1.8 cm                0.6 - 1.0 / 0.6 - 0.9 cm

 LVPW Diastolic Thickness          1.7 cm                0.6 - 1.0 / 0.6 - 0.9 cm

 LV Relative Wall Thickness        0.9                   

 RV Internal Dim ED PLAX           2.1 cm                

 LVOT Diameter                     2.0 cm                

 LA Systolic Diameter LX           3.9 cm                3.0 - 4.0 / 2.7 - 3.8 cm

 LV Ejection Fraction MOD 4C       60.3 %                

 LV Cardiac Index MOD 4C           4120.1 cm/minm     

 LV Ejection Fraction 4C AL        61.6 %                

 LV Cardiac Index 4C AL            4367.8 cm/minm     

 

 M-MODE

 Aortic Root Diameter MM           2.8 cm                

 LA Systolic Diameter MM           3.6 cm                

 LA Ao Ratio MM                    1.3                   

 AV Cusp Separation MM             2.0 cm                

 

 DOPPLER

 AV Peak Velocity                  113.0 cm/s            

 AV Peak Gradient                  5.1 mmHg              

 LVOT Peak Velocity                100.0 cm/s            

 LVOT Peak Gradient                4.0 mmHg              

 AV Area Cont Eq pk                2.8 cm               

 MV Area PHT                       3.6 cm               

 LV E' Lateral Velocity            9.7 cm/s              

 LV E' Septal Velocity             6.9 cm/s              

 TR Peak Velocity                  289.0 cm/s            

 TR Peak Gradient                  33.4 mmHg             

 Right Atrial Pressure             10.0 mmHg             

 Pulmonary Artery Systolic Pressu  43.4 mmHg             

 Right Ventricular Systolic Press  43.4 mmHg             

 PV Peak Velocity                  115.0 cm/s            

 PV Peak Gradient                  5.3 mmHg              

 

 

 FINDINGS

 

 LEFT VENTRICLE

 The left ventricular systolic function is normal with an estimated ejection fraction in the range of
 55-60%. 

 Normal left ventricular size. 

 Moderate concentric left ventricular hypertrophy. 

 No regional wall motion abnormalities are present. 

 

 RIGHT VENTRICLE

 Normal right ventricular size and systolic function.  

 

 LEFT ATRIUM

 The left atrial size is normal.  

 

 RIGHT ATRIUM

 The right atrial size is normal.  

 

 ATRIAL SEPTUM

 Normal atrial septal thickness without atrial level shunting by limited color doppler interrogation.
  

 

 AORTA

 The aortic root and proximal ascending aorta are normal in size on limited imaging.  

 

 MITRAL VALVE

 Structurally normal mitral valve. 

 Trace mitral valve regurgitation. 

 

 AORTIC VALVE

 Trileaflet aortic valve. No aortic valve stenosis or regurgitation.  

 

 TRICUSPID VALVE

 Structurally normal tricuspid valve. 

 There is trace tricuspid valve regurgitation. 

 The estimated pulmonary arterial pressure is 43.4 mmHg. 

 

 PULMONARY VALVE

 Trivial pulmonary valve regurgitation. 

 

 VESSELS

 The inferior vena cava is normal in size.  

 

 PERICARDIUM

 No pericardial effusion.  

 

 

 

 

  Babatunde Jerez MD

  (Electronically Signed)

  Final Date:15 January 2018 16:54 Doctor's office

## 2018-05-25 ENCOUNTER — EMERGENCY (EMERGENCY)
Facility: HOSPITAL | Age: 63
LOS: 1 days | Discharge: HOME | End: 2018-05-25
Attending: EMERGENCY MEDICINE
Payer: COMMERCIAL

## 2018-05-25 VITALS
DIASTOLIC BLOOD PRESSURE: 78 MMHG | RESPIRATION RATE: 18 BRPM | HEIGHT: 69 IN | OXYGEN SATURATION: 98 % | SYSTOLIC BLOOD PRESSURE: 157 MMHG | WEIGHT: 151.9 LBS | TEMPERATURE: 98 F | HEART RATE: 88 BPM

## 2018-05-25 PROCEDURE — 99284 EMERGENCY DEPT VISIT MOD MDM: CPT

## 2018-05-25 NOTE — ED PROVIDER NOTE - NS ED ROS FT
no weight change, no fever or chills +similar episode of sx in the past  no recent travel, no recent abox, no sick contacts  no recent change in medications  no rash, no bruises  no visual changes no eye discharge  no cough cold or congestion,   no sob, no chest pain  no orthopnea, no pnd  no abd pain, no n/v/d  no hematuria, no change in urinary habits  no joint pain, no deformity  no headache, no paresthesia

## 2018-05-25 NOTE — ED PROVIDER NOTE - MEDICAL DECISION MAKING DETAILS
63 y/o M brought in for AMS, found to have low finger stick, plan to check labs, urine, finger sticks q1hour and re-evaluate.

## 2018-05-25 NOTE — ED PROVIDER NOTE - OBJECTIVE STATEMENT
63 y/o M with hx of diabetes presents to the ED c/o hypoglycemia that began today. Pt currently takes insulin, 2 different medications: one takes 20 units at night, other 3x a day prn. Last medication dosage 6 units ~8-9 hours ago, sugar was read around ~176. Pt states he took medication after eating a heavy lunch. Denies fever, chills, urinary sx, chest pain, shortness of breath, nausea/vomiting. Pt states he had similar episode that occurred ~7 months ago.

## 2018-05-25 NOTE — ED PROVIDER NOTE - PHYSICAL EXAMINATION
Constitutional: Appears comfortably, dazed, talking slowly  Head: NC/AT, no swelling  Eyes: EOMI, no swelling  Mouth: mm moist  Neck: supple, trachea is midline  Chest: Bilateral air entry, symmetrical chest expansion, no distress  Heart: S1 S2 distant  Abdomen: abd soft, non tender  Musc/Skel: extremities with no swelling, no deformity, no spine tenderness, distal pulses present  Neuro: A&Ox3, no focal deficits

## 2018-05-25 NOTE — ED ADULT TRIAGE NOTE - CHIEF COMPLAINT QUOTE
pt a+ox4, presents to ED for low blood sugar. per EMS, pt combative on scene, initial sugar 41. EMS gave honey and milk, sugar. family then gave pt kfc, repeat finger stick in 70s per ems. initial finger stick in triage 127.

## 2018-05-26 VITALS
TEMPERATURE: 98 F | RESPIRATION RATE: 19 BRPM | DIASTOLIC BLOOD PRESSURE: 72 MMHG | HEART RATE: 78 BPM | OXYGEN SATURATION: 98 % | SYSTOLIC BLOOD PRESSURE: 140 MMHG

## 2018-05-26 LAB
ALBUMIN SERPL ELPH-MCNC: 4.5 G/DL — SIGNIFICANT CHANGE UP (ref 3.3–5.2)
ALP SERPL-CCNC: 77 U/L — SIGNIFICANT CHANGE UP (ref 40–120)
ALT FLD-CCNC: 29 U/L — SIGNIFICANT CHANGE UP
AMPHET UR-MCNC: NEGATIVE — SIGNIFICANT CHANGE UP
ANION GAP SERPL CALC-SCNC: 15 MMOL/L — SIGNIFICANT CHANGE UP (ref 5–17)
APPEARANCE UR: CLEAR — SIGNIFICANT CHANGE UP
APTT BLD: 31.5 SEC — SIGNIFICANT CHANGE UP (ref 27.5–37.4)
AST SERPL-CCNC: 22 U/L — SIGNIFICANT CHANGE UP
BACTERIA # UR AUTO: ABNORMAL
BARBITURATES UR SCN-MCNC: NEGATIVE — SIGNIFICANT CHANGE UP
BASOPHILS # BLD AUTO: 0 K/UL — SIGNIFICANT CHANGE UP (ref 0–0.2)
BASOPHILS NFR BLD AUTO: 0.2 % — SIGNIFICANT CHANGE UP (ref 0–2)
BENZODIAZ UR-MCNC: NEGATIVE — SIGNIFICANT CHANGE UP
BILIRUB SERPL-MCNC: 0.8 MG/DL — SIGNIFICANT CHANGE UP (ref 0.4–2)
BILIRUB UR-MCNC: NEGATIVE — SIGNIFICANT CHANGE UP
BUN SERPL-MCNC: 21 MG/DL — HIGH (ref 8–20)
CALCIUM SERPL-MCNC: 9.6 MG/DL — SIGNIFICANT CHANGE UP (ref 8.6–10.2)
CHLORIDE SERPL-SCNC: 99 MMOL/L — SIGNIFICANT CHANGE UP (ref 98–107)
CO2 SERPL-SCNC: 24 MMOL/L — SIGNIFICANT CHANGE UP (ref 22–29)
COCAINE METAB.OTHER UR-MCNC: NEGATIVE — SIGNIFICANT CHANGE UP
COLOR SPEC: YELLOW — SIGNIFICANT CHANGE UP
CREAT SERPL-MCNC: 1.03 MG/DL — SIGNIFICANT CHANGE UP (ref 0.5–1.3)
DIFF PNL FLD: ABNORMAL
EOSINOPHIL # BLD AUTO: 0 K/UL — SIGNIFICANT CHANGE UP (ref 0–0.5)
EOSINOPHIL NFR BLD AUTO: 0 % — SIGNIFICANT CHANGE UP (ref 0–5)
EPI CELLS # UR: NEGATIVE — SIGNIFICANT CHANGE UP
GLUCOSE SERPL-MCNC: 415 MG/DL — HIGH (ref 70–115)
GLUCOSE UR QL: 250 MG/DL
HCT VFR BLD CALC: 36.7 % — LOW (ref 42–52)
HGB BLD-MCNC: 11.9 G/DL — LOW (ref 14–18)
INR BLD: 0.99 RATIO — SIGNIFICANT CHANGE UP (ref 0.88–1.16)
KETONES UR-MCNC: ABNORMAL
LEUKOCYTE ESTERASE UR-ACNC: NEGATIVE — SIGNIFICANT CHANGE UP
LYMPHOCYTES # BLD AUTO: 0.7 K/UL — LOW (ref 1–4.8)
LYMPHOCYTES # BLD AUTO: 15.3 % — LOW (ref 20–55)
MAGNESIUM SERPL-MCNC: 2.1 MG/DL — SIGNIFICANT CHANGE UP (ref 1.6–2.6)
MCHC RBC-ENTMCNC: 28.1 PG — SIGNIFICANT CHANGE UP (ref 27–31)
MCHC RBC-ENTMCNC: 32.4 G/DL — SIGNIFICANT CHANGE UP (ref 32–36)
MCV RBC AUTO: 86.8 FL — SIGNIFICANT CHANGE UP (ref 80–94)
METHADONE UR-MCNC: NEGATIVE — SIGNIFICANT CHANGE UP
MONOCYTES # BLD AUTO: 0.3 K/UL — SIGNIFICANT CHANGE UP (ref 0–0.8)
MONOCYTES NFR BLD AUTO: 7.2 % — SIGNIFICANT CHANGE UP (ref 3–10)
NEUTROPHILS # BLD AUTO: 3.6 K/UL — SIGNIFICANT CHANGE UP (ref 1.8–8)
NEUTROPHILS NFR BLD AUTO: 77.1 % — HIGH (ref 37–73)
NITRITE UR-MCNC: NEGATIVE — SIGNIFICANT CHANGE UP
OPIATES UR-MCNC: NEGATIVE — SIGNIFICANT CHANGE UP
PCP SPEC-MCNC: SIGNIFICANT CHANGE UP
PCP UR-MCNC: NEGATIVE — SIGNIFICANT CHANGE UP
PH UR: 5 — SIGNIFICANT CHANGE UP (ref 5–8)
PLATELET # BLD AUTO: 165 K/UL — SIGNIFICANT CHANGE UP (ref 150–400)
POTASSIUM SERPL-MCNC: 4.5 MMOL/L — SIGNIFICANT CHANGE UP (ref 3.5–5.3)
POTASSIUM SERPL-SCNC: 4.5 MMOL/L — SIGNIFICANT CHANGE UP (ref 3.5–5.3)
PROT SERPL-MCNC: 7 G/DL — SIGNIFICANT CHANGE UP (ref 6.6–8.7)
PROT UR-MCNC: NEGATIVE MG/DL — SIGNIFICANT CHANGE UP
PROTHROM AB SERPL-ACNC: 10.9 SEC — SIGNIFICANT CHANGE UP (ref 9.8–12.7)
RBC # BLD: 4.23 M/UL — LOW (ref 4.6–6.2)
RBC # FLD: 13.6 % — SIGNIFICANT CHANGE UP (ref 11–15.6)
RBC CASTS # UR COMP ASSIST: SIGNIFICANT CHANGE UP /HPF (ref 0–4)
SODIUM SERPL-SCNC: 138 MMOL/L — SIGNIFICANT CHANGE UP (ref 135–145)
SP GR SPEC: 1.02 — SIGNIFICANT CHANGE UP (ref 1.01–1.02)
THC UR QL: NEGATIVE — SIGNIFICANT CHANGE UP
TSH SERPL-MCNC: 1.07 UIU/ML — SIGNIFICANT CHANGE UP (ref 0.27–4.2)
UROBILINOGEN FLD QL: NEGATIVE MG/DL — SIGNIFICANT CHANGE UP
WBC # BLD: 4.7 K/UL — LOW (ref 4.8–10.8)
WBC # FLD AUTO: 4.7 K/UL — LOW (ref 4.8–10.8)
WBC UR QL: SIGNIFICANT CHANGE UP

## 2018-05-26 PROCEDURE — 84443 ASSAY THYROID STIM HORMONE: CPT

## 2018-05-26 PROCEDURE — 93010 ELECTROCARDIOGRAM REPORT: CPT

## 2018-05-26 PROCEDURE — 99283 EMERGENCY DEPT VISIT LOW MDM: CPT

## 2018-05-26 PROCEDURE — 80307 DRUG TEST PRSMV CHEM ANLYZR: CPT

## 2018-05-26 PROCEDURE — 36415 COLL VENOUS BLD VENIPUNCTURE: CPT

## 2018-05-26 PROCEDURE — 85610 PROTHROMBIN TIME: CPT

## 2018-05-26 PROCEDURE — 82962 GLUCOSE BLOOD TEST: CPT

## 2018-05-26 PROCEDURE — 83735 ASSAY OF MAGNESIUM: CPT

## 2018-05-26 PROCEDURE — 85027 COMPLETE CBC AUTOMATED: CPT

## 2018-05-26 PROCEDURE — 85730 THROMBOPLASTIN TIME PARTIAL: CPT

## 2018-05-26 PROCEDURE — 80053 COMPREHEN METABOLIC PANEL: CPT

## 2018-05-26 PROCEDURE — 93005 ELECTROCARDIOGRAM TRACING: CPT

## 2018-05-26 PROCEDURE — 81001 URINALYSIS AUTO W/SCOPE: CPT

## 2018-05-26 RX ORDER — SODIUM CHLORIDE 9 MG/ML
1000 INJECTION INTRAMUSCULAR; INTRAVENOUS; SUBCUTANEOUS
Qty: 0 | Refills: 0 | Status: DISCONTINUED | OUTPATIENT
Start: 2018-05-26 | End: 2018-05-30

## 2018-05-26 NOTE — ED ADULT NURSE NOTE - OBJECTIVE STATEMENT
Received patient in CDU3L, a&ox4, able to make needs known. Patient presents to ED for hypoglycemia. As per triage note, pt a+ox4, presents to ED for low blood sugar. per EMS, pt combative on scene, initial sugar 41. EMS gave honey and milk, sugar. family then gave pt kfc, repeat finger stick in 70s per ems. initial finger stick in triage 127. Patient denies any pain or discomfort at this time. FS @ 439, MD aware, no order given at this time. Patient denies n/v/d, headache, dizziness, sob or chest pain. Patient not in respiratory distress. To continue to monitor.

## 2018-05-26 NOTE — ED ADULT NURSE NOTE - DISCHARGE TEACHING
F/u with PMD in 24-48 hours, If with new or worsening symptoms call 911 or go to the nearest hospital

## 2018-05-26 NOTE — ED ADULT NURSE REASSESSMENT NOTE - NS ED NURSE REASSESS COMMENT FT1
As per Dr. Truong, administer 1 L of NS Bolus now and do fingertick one more time after the last one. Order noted and carried out.

## 2022-03-08 NOTE — H&P ADULT - NSCORESITESY/N_GEN_A_CORE_RD
No Lisbeth Tarango, PGY-1, EM:  The patient was re-examined after interventions and is feeling better.

## 2022-08-02 ENCOUNTER — EMERGENCY (EMERGENCY)
Facility: HOSPITAL | Age: 67
LOS: 1 days | Discharge: HOME | End: 2022-08-02
Attending: EMERGENCY MEDICINE
Payer: MEDICARE

## 2022-08-02 VITALS
DIASTOLIC BLOOD PRESSURE: 70 MMHG | OXYGEN SATURATION: 99 % | HEIGHT: 69 IN | RESPIRATION RATE: 18 BRPM | TEMPERATURE: 98 F | SYSTOLIC BLOOD PRESSURE: 118 MMHG | WEIGHT: 160.06 LBS | HEART RATE: 99 BPM

## 2022-08-02 VITALS
HEART RATE: 100 BPM | DIASTOLIC BLOOD PRESSURE: 81 MMHG | SYSTOLIC BLOOD PRESSURE: 143 MMHG | TEMPERATURE: 98 F | RESPIRATION RATE: 20 BRPM | OXYGEN SATURATION: 100 %

## 2022-08-02 PROBLEM — E11.59 TYPE 2 DIABETES MELLITUS WITH OTHER CIRCULATORY COMPLICATIONS: Chronic | Status: ACTIVE | Noted: 2017-04-08

## 2022-08-02 PROBLEM — I10 ESSENTIAL (PRIMARY) HYPERTENSION: Chronic | Status: ACTIVE | Noted: 2017-04-08

## 2022-08-02 LAB
ALBUMIN SERPL ELPH-MCNC: 4.6 G/DL — SIGNIFICANT CHANGE UP (ref 3.3–5.2)
ALP SERPL-CCNC: 78 U/L — SIGNIFICANT CHANGE UP (ref 40–120)
ALT FLD-CCNC: 34 U/L — SIGNIFICANT CHANGE UP
ANION GAP SERPL CALC-SCNC: 11 MMOL/L — SIGNIFICANT CHANGE UP (ref 5–17)
ANION GAP SERPL CALC-SCNC: 12 MMOL/L — SIGNIFICANT CHANGE UP (ref 5–17)
AST SERPL-CCNC: 20 U/L — SIGNIFICANT CHANGE UP
BILIRUB DIRECT SERPL-MCNC: 0.1 MG/DL — SIGNIFICANT CHANGE UP (ref 0–0.3)
BILIRUB INDIRECT FLD-MCNC: 0.6 MG/DL — SIGNIFICANT CHANGE UP (ref 0.2–1)
BILIRUB SERPL-MCNC: 0.7 MG/DL — SIGNIFICANT CHANGE UP (ref 0.4–2)
BUN SERPL-MCNC: 27.3 MG/DL — HIGH (ref 8–20)
BUN SERPL-MCNC: 30 MG/DL — HIGH (ref 8–20)
CALCIUM SERPL-MCNC: 9.5 MG/DL — SIGNIFICANT CHANGE UP (ref 8.4–10.5)
CALCIUM SERPL-MCNC: 9.6 MG/DL — SIGNIFICANT CHANGE UP (ref 8.4–10.5)
CHLORIDE SERPL-SCNC: 100 MMOL/L — SIGNIFICANT CHANGE UP (ref 98–107)
CHLORIDE SERPL-SCNC: 101 MMOL/L — SIGNIFICANT CHANGE UP (ref 98–107)
CO2 SERPL-SCNC: 25 MMOL/L — SIGNIFICANT CHANGE UP (ref 22–29)
CO2 SERPL-SCNC: 26 MMOL/L — SIGNIFICANT CHANGE UP (ref 22–29)
CREAT SERPL-MCNC: 1.18 MG/DL — SIGNIFICANT CHANGE UP (ref 0.5–1.3)
CREAT SERPL-MCNC: 1.43 MG/DL — HIGH (ref 0.5–1.3)
EGFR: 53 ML/MIN/1.73M2 — LOW
EGFR: 67 ML/MIN/1.73M2 — SIGNIFICANT CHANGE UP
GLUCOSE SERPL-MCNC: 194 MG/DL — HIGH (ref 70–99)
GLUCOSE SERPL-MCNC: 46 MG/DL — CRITICAL LOW (ref 70–99)
HCT VFR BLD CALC: 34.1 % — LOW (ref 39–50)
HGB BLD-MCNC: 11.4 G/DL — LOW (ref 13–17)
MCHC RBC-ENTMCNC: 29.2 PG — SIGNIFICANT CHANGE UP (ref 27–34)
MCHC RBC-ENTMCNC: 33.4 GM/DL — SIGNIFICANT CHANGE UP (ref 32–36)
MCV RBC AUTO: 87.4 FL — SIGNIFICANT CHANGE UP (ref 80–100)
PLATELET # BLD AUTO: 154 K/UL — SIGNIFICANT CHANGE UP (ref 150–400)
POTASSIUM SERPL-MCNC: 3.8 MMOL/L — SIGNIFICANT CHANGE UP (ref 3.5–5.3)
POTASSIUM SERPL-MCNC: 4.1 MMOL/L — SIGNIFICANT CHANGE UP (ref 3.5–5.3)
POTASSIUM SERPL-SCNC: 3.8 MMOL/L — SIGNIFICANT CHANGE UP (ref 3.5–5.3)
POTASSIUM SERPL-SCNC: 4.1 MMOL/L — SIGNIFICANT CHANGE UP (ref 3.5–5.3)
PROT SERPL-MCNC: 7.2 G/DL — SIGNIFICANT CHANGE UP (ref 6.6–8.7)
RBC # BLD: 3.9 M/UL — LOW (ref 4.2–5.8)
RBC # FLD: 13 % — SIGNIFICANT CHANGE UP (ref 10.3–14.5)
SODIUM SERPL-SCNC: 137 MMOL/L — SIGNIFICANT CHANGE UP (ref 135–145)
SODIUM SERPL-SCNC: 138 MMOL/L — SIGNIFICANT CHANGE UP (ref 135–145)
TROPONIN T SERPL-MCNC: <0.01 NG/ML — SIGNIFICANT CHANGE UP (ref 0–0.06)
WBC # BLD: 4.85 K/UL — SIGNIFICANT CHANGE UP (ref 3.8–10.5)
WBC # FLD AUTO: 4.85 K/UL — SIGNIFICANT CHANGE UP (ref 3.8–10.5)

## 2022-08-02 PROCEDURE — 36415 COLL VENOUS BLD VENIPUNCTURE: CPT

## 2022-08-02 PROCEDURE — 99291 CRITICAL CARE FIRST HOUR: CPT | Mod: 25

## 2022-08-02 PROCEDURE — 85027 COMPLETE CBC AUTOMATED: CPT

## 2022-08-02 PROCEDURE — 80076 HEPATIC FUNCTION PANEL: CPT

## 2022-08-02 PROCEDURE — 80048 BASIC METABOLIC PNL TOTAL CA: CPT

## 2022-08-02 PROCEDURE — 99291 CRITICAL CARE FIRST HOUR: CPT

## 2022-08-02 PROCEDURE — 93010 ELECTROCARDIOGRAM REPORT: CPT

## 2022-08-02 PROCEDURE — 96360 HYDRATION IV INFUSION INIT: CPT

## 2022-08-02 PROCEDURE — 93005 ELECTROCARDIOGRAM TRACING: CPT

## 2022-08-02 PROCEDURE — 71045 X-RAY EXAM CHEST 1 VIEW: CPT | Mod: 26

## 2022-08-02 PROCEDURE — 71045 X-RAY EXAM CHEST 1 VIEW: CPT

## 2022-08-02 PROCEDURE — 96361 HYDRATE IV INFUSION ADD-ON: CPT

## 2022-08-02 PROCEDURE — 82962 GLUCOSE BLOOD TEST: CPT

## 2022-08-02 PROCEDURE — 84484 ASSAY OF TROPONIN QUANT: CPT

## 2022-08-02 RX ORDER — SODIUM CHLORIDE 9 MG/ML
1000 INJECTION INTRAMUSCULAR; INTRAVENOUS; SUBCUTANEOUS ONCE
Refills: 0 | Status: COMPLETED | OUTPATIENT
Start: 2022-08-02 | End: 2022-08-02

## 2022-08-02 RX ORDER — DEXTROSE 10 % IN WATER 10 %
250 INTRAVENOUS SOLUTION INTRAVENOUS ONCE
Refills: 0 | Status: COMPLETED | OUTPATIENT
Start: 2022-08-02 | End: 2022-08-02

## 2022-08-02 RX ADMIN — SODIUM CHLORIDE 1000 MILLILITER(S): 9 INJECTION INTRAMUSCULAR; INTRAVENOUS; SUBCUTANEOUS at 16:16

## 2022-08-02 RX ADMIN — Medication 1500 MILLILITER(S): at 12:32

## 2022-08-02 RX ADMIN — Medication 250 MILLILITER(S): at 13:52

## 2022-08-02 RX ADMIN — SODIUM CHLORIDE 1000 MILLILITER(S): 9 INJECTION INTRAMUSCULAR; INTRAVENOUS; SUBCUTANEOUS at 14:51

## 2022-08-02 NOTE — ED ADULT NURSE NOTE - SUICIDE SCREENING DEPRESSION
----- Message from Carmen Patiño sent at 10/26/2020  3:25 PM CDT -----  Contact: pt  Type: Needs Medical Advice    Who Called: pt  Best Call Back Number:   Additional Information: Requesting a call back regarding Pt requesting call back to schedule appt for GI scope. Thank you!  Please Advise ---Thank you       
Spoke with pt and scheduled procedure and ordered testing per Reshma Mina NP. Pt agreed and verbalized understanding. Instructions reviewed.   
Negative

## 2022-08-02 NOTE — ED PROVIDER NOTE - CLINICAL SUMMARY MEDICAL DECISION MAKING FREE TEXT BOX
Patient is a 63 yo male with PMHx DM on Insulin and Toujeo BIBEMS with near syncope and found to be hypoglycemic to the 50s. Patient states he was at the grocery store when he felt lightheaded and nearly passed out at which point he had to sit down; upon arrival patient BP in the 50s, vitals otherwise stable, resting comfortably. Will give D10, check labs and ecg, and continue to monitor.

## 2022-08-02 NOTE — ED PROVIDER NOTE - PATIENT PORTAL LINK FT
You can access the FollowMyHealth Patient Portal offered by [facility] by registering at the following website: [email or web address]. By joining [employer]’s FollowMyHealth portal, you will also be able to view your health information using other applications (apps) compatible with our system.

## 2022-08-02 NOTE — ED ADULT NURSE NOTE - OBJECTIVE STATEMENT
Pt is alert and oriented. pt states that he was at [employer] when he felt his sugar dropping. Pt states that he then sat on the floor but is unsure if he had LOC. Pt was given orange juice. Pt bs on arrival was 57. Pt denies sob, chest pain, nausea, vomiting, dizziness and pain. Pt resp are even and unlabored, skin color oscar for race. Pt updated on plan of care. Pt resting on cm in nsr.

## 2022-08-02 NOTE — ED PROVIDER NOTE - OBJECTIVE STATEMENT
Patient is a 63 yo male with PMHx DM on Insulin and Toujeo BIBEMS with near syncope and found to be hypoglycemic to the 50s. Patient states he was at the grocery store when he felt lightheaded and nearly passed out at which point he had to sit down; upon arrival patient BP in the 50s, vitals otherwise stable, resting comfortably. Patient denies fevers, taking extra insulin, denies any other symptoms currently. Denies fevers, chills, dizziness, dysphagia, dysarthria, diplopia, photophobia, syncope, cough, congestion, SOB, CP, abdominal pain, neck pain, back pain, diarrhea, dysuria, hematuria, hematochezia, hematemesis, n/v, recent travel, sick contacts, leg swelling.

## 2022-08-02 NOTE — ED PROVIDER NOTE - NSICDXPASTMEDICALHX_GEN_ALL_CORE_FT
PAST MEDICAL HISTORY:  Essential hypertension     Type 2 diabetes mellitus with other circulatory complication

## 2022-08-02 NOTE — ED ADULT TRIAGE NOTE - CHIEF COMPLAINT QUOTE
pt says he was in the food store and his sugar started dropping denies loc. as per ems pt possibly had a seizure in the food store but pt says he did not have one felt his sugar dropping and sat down.  pt was given orange juice and his sugar on arrival is 57. awake and alert on arrival

## 2022-08-02 NOTE — ED PROVIDER NOTE - CARE PROVIDER_API CALL
Darshana Armas)  EndocrinologyMetabDiabetes; Internal Medicine  [address] [place] [id number]  Phone: [phone]  Fax: [phone]  Follow Up Time: Urgent

## 2022-08-02 NOTE — ED PROVIDER NOTE - PROGRESS NOTE DETAILS
JK - patient vss, labs WNL. CXR WNL. Patient asymptomatic, ready and agreeable to DC with return precautions and close endocrinology follow up.

## 2022-08-10 ENCOUNTER — EMERGENCY (EMERGENCY)
Facility: HOSPITAL | Age: 67
LOS: 1 days | Discharge: HOME | End: 2022-08-10
Attending: EMERGENCY MEDICINE
Payer: MEDICARE

## 2022-08-10 VITALS
HEIGHT: 69 IN | DIASTOLIC BLOOD PRESSURE: 75 MMHG | OXYGEN SATURATION: 99 % | TEMPERATURE: 99 F | RESPIRATION RATE: 16 BRPM | HEART RATE: 99 BPM | SYSTOLIC BLOOD PRESSURE: 130 MMHG

## 2022-08-10 LAB
ACANTHOCYTES BLD QL SMEAR: SLIGHT — SIGNIFICANT CHANGE UP
ALBUMIN SERPL ELPH-MCNC: 4.7 G/DL — SIGNIFICANT CHANGE UP (ref 3.3–5.2)
ALP SERPL-CCNC: 77 U/L — SIGNIFICANT CHANGE UP (ref 40–120)
ALT FLD-CCNC: 34 U/L — SIGNIFICANT CHANGE UP
ANION GAP SERPL CALC-SCNC: 14 MMOL/L — SIGNIFICANT CHANGE UP (ref 5–17)
AST SERPL-CCNC: 26 U/L — SIGNIFICANT CHANGE UP
BASOPHILS # BLD AUTO: 0 K/UL — SIGNIFICANT CHANGE UP (ref 0–0.2)
BASOPHILS NFR BLD AUTO: 0 % — SIGNIFICANT CHANGE UP (ref 0–2)
BILIRUB SERPL-MCNC: 0.5 MG/DL — SIGNIFICANT CHANGE UP (ref 0.4–2)
BUN SERPL-MCNC: 30.1 MG/DL — HIGH (ref 8–20)
CALCIUM SERPL-MCNC: 9.8 MG/DL — SIGNIFICANT CHANGE UP (ref 8.4–10.5)
CHLORIDE SERPL-SCNC: 100 MMOL/L — SIGNIFICANT CHANGE UP (ref 98–107)
CO2 SERPL-SCNC: 24 MMOL/L — SIGNIFICANT CHANGE UP (ref 22–29)
CREAT SERPL-MCNC: 1.17 MG/DL — SIGNIFICANT CHANGE UP (ref 0.5–1.3)
EGFR: 68 ML/MIN/1.73M2 — SIGNIFICANT CHANGE UP
EOSINOPHIL # BLD AUTO: 0 K/UL — SIGNIFICANT CHANGE UP (ref 0–0.5)
EOSINOPHIL NFR BLD AUTO: 0 % — SIGNIFICANT CHANGE UP (ref 0–6)
GLUCOSE SERPL-MCNC: 161 MG/DL — HIGH (ref 70–99)
HCT VFR BLD CALC: 33.2 % — LOW (ref 39–50)
HGB BLD-MCNC: 11 G/DL — LOW (ref 13–17)
LYMPHOCYTES # BLD AUTO: 0.55 K/UL — LOW (ref 1–3.3)
LYMPHOCYTES # BLD AUTO: 10.7 % — LOW (ref 13–44)
MANUAL SMEAR VERIFICATION: SIGNIFICANT CHANGE UP
MCHC RBC-ENTMCNC: 29.8 PG — SIGNIFICANT CHANGE UP (ref 27–34)
MCHC RBC-ENTMCNC: 33.1 GM/DL — SIGNIFICANT CHANGE UP (ref 32–36)
MCV RBC AUTO: 90 FL — SIGNIFICANT CHANGE UP (ref 80–100)
MONOCYTES # BLD AUTO: 0.41 K/UL — SIGNIFICANT CHANGE UP (ref 0–0.9)
MONOCYTES NFR BLD AUTO: 8 % — SIGNIFICANT CHANGE UP (ref 2–14)
NEUTROPHILS # BLD AUTO: 4.2 K/UL — SIGNIFICANT CHANGE UP (ref 1.8–7.4)
NEUTROPHILS NFR BLD AUTO: 81.3 % — HIGH (ref 43–77)
OVALOCYTES BLD QL SMEAR: SLIGHT — SIGNIFICANT CHANGE UP
PLAT MORPH BLD: NORMAL — SIGNIFICANT CHANGE UP
PLATELET # BLD AUTO: 134 K/UL — LOW (ref 150–400)
POIKILOCYTOSIS BLD QL AUTO: SLIGHT — SIGNIFICANT CHANGE UP
POLYCHROMASIA BLD QL SMEAR: SLIGHT — SIGNIFICANT CHANGE UP
POTASSIUM SERPL-MCNC: 4.7 MMOL/L — SIGNIFICANT CHANGE UP (ref 3.5–5.3)
POTASSIUM SERPL-SCNC: 4.7 MMOL/L — SIGNIFICANT CHANGE UP (ref 3.5–5.3)
PROT SERPL-MCNC: 7.3 G/DL — SIGNIFICANT CHANGE UP (ref 6.6–8.7)
RBC # BLD: 3.69 M/UL — LOW (ref 4.2–5.8)
RBC # FLD: 13 % — SIGNIFICANT CHANGE UP (ref 10.3–14.5)
RBC BLD AUTO: ABNORMAL
SODIUM SERPL-SCNC: 138 MMOL/L — SIGNIFICANT CHANGE UP (ref 135–145)
WBC # BLD: 5.16 K/UL — SIGNIFICANT CHANGE UP (ref 3.8–10.5)
WBC # FLD AUTO: 5.16 K/UL — SIGNIFICANT CHANGE UP (ref 3.8–10.5)

## 2022-08-10 PROCEDURE — 99284 EMERGENCY DEPT VISIT MOD MDM: CPT

## 2022-08-10 PROCEDURE — 85025 COMPLETE CBC W/AUTO DIFF WBC: CPT

## 2022-08-10 PROCEDURE — 36415 COLL VENOUS BLD VENIPUNCTURE: CPT

## 2022-08-10 PROCEDURE — 82962 GLUCOSE BLOOD TEST: CPT

## 2022-08-10 PROCEDURE — 80053 COMPREHEN METABOLIC PANEL: CPT

## 2022-08-10 PROCEDURE — 99283 EMERGENCY DEPT VISIT LOW MDM: CPT

## 2022-08-10 NOTE — ED PROVIDER NOTE - OBJECTIVE STATEMENT
pt is a 68 y/o male with a pmhx of DM on insulin and Toujeo brought in to the ed for evaluation. per EMS family stating that patient was screaming tonight, family states when this happens they know his blood sugar is low. pt took his blood sugar at home and it was very low. pt states after eating and drinking feels better. pt has appt with doctor on the 18th for diabetes. pt denies any recent change in medications  pt denies cp sob head injury headache neck pain visual changes abd pain nausea vomiting numbness or loss of sensation back pain

## 2022-08-10 NOTE — ED ADULT NURSE NOTE - OBJECTIVE STATEMENT
Assumed care of pt at 0350Pt A&Ox4 c/o having low blood sugar. when asked how he knew he had low blood sugar pt stated, " I knew by my reaction, I was making a lot of noises in my bedroom, and pt family called 911". pt states " I thought I was locked in the closet because everything was so dark, I blacked out, until the police opened the door,". no s/s of hypoglycemia noted.

## 2022-08-10 NOTE — ED ADULT TRIAGE NOTE - CHIEF COMPLAINT QUOTE
Pt. BIBA for low blood sugar. Pt. states he got a new dexcom and doesn't know when his blood sugar becomes low. As per EMS, family states he was banging around the house and screaming.  Pt. states this happens when his blood sugar becomes low. Pt. calm and cooperative in triage, denies SI, HI, AH, VH drugs or alcohol. FS 78.

## 2022-08-10 NOTE — ED PROVIDER NOTE - NSFOLLOWUPINSTRUCTIONS_ED_ALL_ED_FT
Log Out.      [employer]® CareNotes®     :  [facility]  	                     HYPOGLYCEMIA IN A PERSON WITH DIABETES - General Information           Hypoglycemia in a Person with Diabetes    WHAT YOU NEED TO KNOW:    What is hypoglycemia? Hypoglycemia is a serious condition that happens when your blood glucose (sugar) level drops too low. The blood sugar level is usually too high in a person with diabetes, but the level can also drop too low. It is important to follow your diabetes management plan to keep your blood sugar level steady.    What increases my risk for hypoglycemia?   •Binge eating, eating large amounts of carbohydrates in processed foods such as potato chips      •A missed meal, or a meal eaten later than usual      •Vomiting      •Certain medicines, including insulin or other diabetes medicine      •More exercise than usual, without extra food      •Alcohol use      •Pregnancy, older age      •Decreased liver or kidney function      •Not knowing your symptoms are symptoms of hypoglycemia      What are the signs and symptoms of hypoglycemia?   •Headache, hunger, or shakiness      •Trouble thinking or moodiness      •Sweating, or a pounding heartbeat      •Forgetfulness, confusion, or double vision      •Weakness or trouble walking      •Numbness and tingling around your mouth      •Seizures, coma, or loss of consciousness      How do I manage hypoglycemia?   •Check your blood sugar level right away if you have symptoms of hypoglycemia. Hypoglycemia usually happens when your blood sugar level is 70 mg/dL or below. Ask your diabetes care team provider what blood sugar level is too low for you.      •If your blood sugar level is too low, eat or drink 15 grams of fast-acting carbohydrate. Examples of this amount of fast-acting carbohydrate are 4 ounces (½ cup) of fruit juice or 4 ounces of regular soda. Other examples are 2 tablespoons of raisins or 1 tube of glucose gel.  Ways to Raise Your Blood Sugar     Check your blood sugar level 15 minutes later. Sit still as you wait. If the level is still low (less than 100 mg/dL), have another 15 grams of carbohydrate. When the level returns to 100 mg/dL, eat a meal if it is time. If your meal time is more than 1 hour away, eat a snack. The snack should contain carbohydrates, such as the following:?3/4 cup of cereal      ?1 cup of skim or low fat milk      ?6 soda crackers      ?1/2 of a turkey sandwich      ?15 fat-free chips  This will help prevent another drop in blood sugar. Always carefully follow your provider's instructions on how to treat low blood sugar levels.    •Always carry a source of fast-acting carbohydrate. If you have symptoms of hypoglycemia and you do not have a blood glucose meter, have a source of fast-acting carbohydrate anyway. Avoid carbohydrate foods that are high in fat. The fat content may make the carbohydrate take longer to increase your blood sugar level. Ask your provider if you should carry a glucagon kit. Glucagon is a medicine that is injected when you develop severe hypoglycemia and become unconscious. Check the expiration date every month and replace it before it expires.      •Teach others how to help you if you have symptoms of hypoglycemia. Tell them about the symptoms of hypoglycemia. Ask them to give you a source of fast-acting carbohydrate if you cannot get it yourself. Ask them to give you a glucagon injection if you have signs of hypoglycemia and you become unconscious or have a seizure. Ask them to call the local emergency number (911 in the ). This is an emergency. Tell them never to try to make you swallow anything if you faint or have a seizure.      •Wear medical alert jewelry or carry a card that says you have diabetes. Ask where to get these items.  Medical Alert Jewelry           How do I prevent hypoglycemia?   •Take diabetes medicine as directed. Take your medicine at the right time and in the right amount. Do not double the amount of medicine you take unless instructed by your diabetes care team provider. You may need oral diabetes medicine, insulin, or both to help control your blood sugar levels. Your healthcare provider will teach you how and when to take oral diabetes medicine. You will also be taught about side effects oral diabetes medicine can cause. Insulin may be added if oral diabetes medicine becomes less effective over time. Insulin may be injected, or given through a pump or pen. You and your care team will discuss which method is best for you.?An insulin pump is an implanted device that gives your insulin 24 hours a day. An insulin pump prevents the need for multiple insulin injections in a day.             ?An insulin pen is a device prefilled with the right amount of insulin.  Insulin Pen            •Eat meals and snacks as directed. Talk to your dietitian or provider about a meal plan that is right for you. Do not skip meals.  Plate Method           •Check your blood sugar level as directed. Ask your provider what your blood sugar levels should be before and after you eat. Ask when and how often to check your blood sugar level. You may need to check a drop of blood in a glucose test machine. You may need to check at least 3 times each day. Record your blood sugar level results and take the record with you when you see your care team. They may use it to make changes to your medicine, food, or exercise schedules. Your care team provider may recommend a continuous glucose monitor (CGM). A CGM is a device that is worn at all times. The CGM checks your blood sugar every 5 minutes. It sends results to an electronic device such as a smart phone.  How to check your blood sugar       Continuous Glucose Monitoring            •Check your blood sugar level before you exercise. Physical activity, such as exercise, can decrease your blood sugar level. If your blood sugar level is less than 100 mg/dL, have a carbohydrate snack. Examples are 4 to 6 crackers, ½ banana, 8 ounces (1 cup) of nonfat or 1% milk, or 4 ounces (½ cup) of juice. If you will be active for more than 1 hour, you may need to check your blood sugar level every 30 minutes. Your provider may also recommend that you check your blood sugar level after your activity.      •Know the risks if you choose to drink alcohol. Alcohol can cause your blood sugar levels to be low if you use insulin. Alcohol can cause high blood sugar levels and weight gain if you drink too much. Women 21 years or older and men 65 years or older should limit alcohol to 1 drink a day. Men aged 21 to 64 years should limit alcohol to 2 drinks a day. A drink of alcohol is 12 ounces of beer, 5 ounces of wine, or 1½ ounces of liquor.      Have someone call your local emergency number (911 in the [place]) if:   •You have a seizure or pass out.      •Your blood sugar is less than 50 mg/dL and does not respond to treatment.      •You feel you are going to pass out.      •You have trouble thinking clearly.      When should I call my doctor or diabetes care team provider?   •You have had symptoms of low blood sugar several times.      •You have questions about the amount of insulin or diabetes medicine you are taking.      •You have questions or concerns about your condition or care.      CARE AGREEMENT:    You have the right to help plan your care. Learn about your health condition and how it may be treated. Discuss treatment options with your healthcare providers to decide what care you want to receive. You always have the right to refuse treatment.        © Copyright [employer] 2022           back to top                          © Copyright [employer] 2022

## 2022-08-10 NOTE — ED PROVIDER NOTE - PATIENT PORTAL LINK FT
You can access the FollowMyHealth Patient Portal offered by [facility] by registering at the following website: [email or web address]. By joining [employer]’s FollowMyHealth portal, you will also be able to view your health information using other applications (apps) compatible with our system.

## 2022-08-10 NOTE — ED PROVIDER NOTE - CLINICAL SUMMARY MEDICAL DECISION MAKING FREE TEXT BOX
pt eating and drinking in stretcher no complaints at this time obtain lab work - reviewed pt to follow up with primary care doctor for diabetes management, patient alert and orientated x 3

## 2022-08-15 NOTE — ED PROVIDER NOTE - PHYSICAL EXAMINATION
Const: Awake, alert and oriented. In no acute distress. Well appearing.  HEENT: NC/AT. Moist mucous membranes.  Eyes: No scleral icterus. EOMI.  Neck:. Soft and supple. Full ROM without pain.  Cardiac:  +S1/S2. No murmurs. Peripheral pulses 2+ and symmetric. No LE edema.  Resp: Speaking in full sentences. No evidence of respiratory distress. No wheezes, rales or rhonchi.  Abd: Soft, non-tender, non-distended. Normal bowel sounds in all 4 quadrants. No guarding or rebound.  Back: Spine midline and non-tender. No CVAT.  Skin: No rashes, abrasions or lacerations.  Lymph: No cervical lymphadenopathy.  Neuro: Awake, alert & oriented x 3. CN II-XII intact, Moves all extremities symmetrically.
No

## 2022-09-12 PROBLEM — Z00.00 ENCOUNTER FOR PREVENTIVE HEALTH EXAMINATION: Status: ACTIVE | Noted: 2022-09-12

## 2022-09-13 ENCOUNTER — NON-APPOINTMENT (OUTPATIENT)
Age: 67
End: 2022-09-13

## 2022-09-13 ENCOUNTER — APPOINTMENT (OUTPATIENT)
Dept: ENDOCRINOLOGY | Facility: CLINIC | Age: 67
End: 2022-09-13

## 2022-09-13 ENCOUNTER — RESULT CHARGE (OUTPATIENT)
Age: 67
End: 2022-09-13

## 2022-09-13 VITALS
WEIGHT: 160 LBS | SYSTOLIC BLOOD PRESSURE: 118 MMHG | BODY MASS INDEX: 22.4 KG/M2 | HEART RATE: 76 BPM | DIASTOLIC BLOOD PRESSURE: 64 MMHG | HEIGHT: 71 IN

## 2022-09-13 LAB — GLUCOSE BLDC GLUCOMTR-MCNC: 233

## 2022-09-13 PROCEDURE — G2212 PROLONG OUTPT/OFFICE VIS: CPT

## 2022-09-13 PROCEDURE — 82962 GLUCOSE BLOOD TEST: CPT

## 2022-09-13 PROCEDURE — 99205 OFFICE O/P NEW HI 60 MIN: CPT | Mod: 25

## 2022-09-13 RX ORDER — ATORVASTATIN CALCIUM 80 MG/1
80 TABLET, FILM COATED ORAL
Refills: 0 | Status: ACTIVE | COMMUNITY

## 2022-09-13 RX ORDER — LISINOPRIL AND HYDROCHLOROTHIAZIDE TABLETS 20; 12.5 MG/1; MG/1
20-12.5 TABLET ORAL
Refills: 0 | Status: ACTIVE | COMMUNITY

## 2022-09-13 RX ORDER — INSULIN LISPRO 100 [IU]/ML
100 INJECTION, SOLUTION INTRAVENOUS; SUBCUTANEOUS
Refills: 0 | Status: ACTIVE | COMMUNITY

## 2022-09-13 RX ORDER — CHOLECALCIFEROL (VITAMIN D3) 1250 MCG
1.25 MG CAPSULE ORAL
Refills: 0 | Status: ACTIVE | COMMUNITY

## 2022-09-13 RX ORDER — AMLODIPINE BESYLATE 2.5 MG/1
2.5 TABLET ORAL
Refills: 0 | Status: ACTIVE | COMMUNITY

## 2022-09-21 ENCOUNTER — LABORATORY RESULT (OUTPATIENT)
Age: 67
End: 2022-09-21

## 2022-09-26 ENCOUNTER — APPOINTMENT (OUTPATIENT)
Dept: ENDOCRINOLOGY | Facility: CLINIC | Age: 67
End: 2022-09-26

## 2022-09-26 PROCEDURE — G0108 DIAB MANAGE TRN  PER INDIV: CPT

## 2022-10-03 ENCOUNTER — APPOINTMENT (OUTPATIENT)
Dept: ENDOCRINOLOGY | Facility: CLINIC | Age: 67
End: 2022-10-03

## 2022-10-03 PROCEDURE — G0108 DIAB MANAGE TRN  PER INDIV: CPT

## 2022-10-05 ENCOUNTER — NON-APPOINTMENT (OUTPATIENT)
Age: 67
End: 2022-10-05

## 2022-10-17 ENCOUNTER — APPOINTMENT (OUTPATIENT)
Dept: ENDOCRINOLOGY | Facility: CLINIC | Age: 67
End: 2022-10-17

## 2022-10-17 ENCOUNTER — NON-APPOINTMENT (OUTPATIENT)
Age: 67
End: 2022-10-17

## 2022-10-17 PROCEDURE — G0108 DIAB MANAGE TRN  PER INDIV: CPT

## 2022-10-29 NOTE — ED ADULT TRIAGE NOTE - CCCP TRG CHIEF CMPLNT
fatigue and weakness impaired balance/cognition/decreased strength impaired balance/cognition/decreased ROM/decreased strength

## 2022-11-04 ENCOUNTER — APPOINTMENT (OUTPATIENT)
Dept: ENDOCRINOLOGY | Facility: CLINIC | Age: 67
End: 2022-11-04

## 2022-11-04 VITALS
HEART RATE: 88 BPM | WEIGHT: 159 LBS | SYSTOLIC BLOOD PRESSURE: 126 MMHG | BODY MASS INDEX: 22.26 KG/M2 | HEIGHT: 71 IN | DIASTOLIC BLOOD PRESSURE: 80 MMHG

## 2022-11-04 DIAGNOSIS — E10.319 TYPE 1 DIABETES MELLITUS WITH UNSPECIFIED DIABETIC RETINOPATHY W/OUT MACULAR EDEMA: ICD-10-CM

## 2022-11-04 DIAGNOSIS — E11.9 TYPE 2 DIABETES MELLITUS W/OUT COMPLICATIONS: ICD-10-CM

## 2022-11-04 DIAGNOSIS — Z79.4 TYPE 2 DIABETES MELLITUS W/OUT COMPLICATIONS: ICD-10-CM

## 2022-11-04 PROCEDURE — 99214 OFFICE O/P EST MOD 30 MIN: CPT | Mod: 25

## 2022-11-04 PROCEDURE — 95251 CONT GLUC MNTR ANALYSIS I&R: CPT

## 2022-11-04 RX ORDER — PEN NEEDLE, DIABETIC 32GX 5/32"
32G X 4 MM NEEDLE, DISPOSABLE MISCELLANEOUS
Qty: 300 | Refills: 0 | Status: ACTIVE | COMMUNITY
Start: 2022-04-20

## 2022-11-04 NOTE — REVIEW OF SYSTEMS
[Nocturia] : nocturia [Fatigue] : no fatigue [Recent Weight Gain (___ Lbs)] : no recent weight gain [Recent Weight Loss (___ Lbs)] : no recent weight loss [Visual Field Defect] : no visual field defect [Blurred Vision] : no blurred vision [Dysphagia] : no dysphagia [Neck Pain] : no neck pain [Dysphonia] : no dysphonia [Chest Pain] : no chest pain [Shortness Of Breath] : no shortness of breath [Constipation] : no constipation [Diarrhea] : no diarrhea [Polyuria] : no polyuria [Polydipsia] : no polydipsia

## 2022-11-04 NOTE — HISTORY OF PRESENT ILLNESS
[FreeTextEntry1] : type: 2 but insulin dependent (c peptide 0, TORIBIO negative)\par diabetes since: 38yo\par number of years on insulin: started 2017\par diagnosed by: routine labs\par current severity: uncontrolled\par FH of diabetes: mother, sister\par complications: b/l DR s/p laser surgery\par \par regimen\par Toujeo 18 units QHS \par Humalog 2/3/6 units with ISF 1:50\par \par Uses víctor 14 day\par Alot of variability\par Average glucose 174\par GMI 7.5%\par Glucose variability 44.2%\par \par Very low 3%\par Low 3%\par Target range 52%\par High 25%\par Very high 17%\par \par Hypoglycemia unawareness \par \par diet: no fast foods, no chinese food, no soda. cut back on apple juice. occ pizza. rice/macaroni/potatoes, meats, low sugar cookies, wheat bread\par exercise: walking\par weight: stable\par \par eye doctor: Q3 months, getting laser treatments for b/l DR\par neuropathy: denies\par CKD: none\par other (PVD/MI/CVA): denies\par non smoker\par \par HLD\par On statin\par \par HTN\par BP IN OFFICE 126/80\par Amlodpine 2.5 mg daily\par Lisinipril-HCTZ 20-12.5MG daily\par \par Vit D Def\par -On weekly 43897 IU supplement

## 2022-11-04 NOTE — ASSESSMENT
[FreeTextEntry1] : \par T2DM- ruled out type 1 but pt does not produce insulin so treated as a type 1 \par -Pt needs a rosas 2 over a rosas 14 day- he needs the alarms as he has hypoglycemic unawareness- rx sent and sample given in office \par -will download rosas in 2 weeks since we set him up on rosas oscar today in office, will give feedback once rosas report downloaded \par -Educated on diet and given education. discussed complications of diabetes at length including MI/CVA/ESRD/dialysis/blindness/amputations/infections/ED\par -decrease toujeo to 15 units (many early morning lows)\par -continue humalog to 2/3/6 units with 1:50 ISF over 150\par -MUST rotate sites\par -continue to follow up with all specialists including ophtho and podiatry \par \par HLD- ldl goal <100, high risk. on statin\par \par HTN- BP acceptable. continue ACEi/hctz/amlodipine\par \par Vitamin D deficiency- check level, on weekly vitamin d\par \par Fasting labs due 12/2022 \par RTO 1/2023\par ---\par With regards to Rosas:\par \par Dx: E10.65\par This patient with diabetes performs 4 glucose checks per day for at least the last 60 days utilizing a home blood glucose monitor. The patient is treated with insulin via 4 injections daily. This patient uses the CGM and glucose monitor readings to frequently adjust their insulin treatment based on set doses adjusted as needed by the physician. In addition, the patient has been to our office for an evaluation of their diabetes control within the past 6 months.\par

## 2022-11-10 RX ORDER — BLOOD SUGAR DIAGNOSTIC
STRIP MISCELLANEOUS
Qty: 300 | Refills: 1 | Status: ACTIVE | COMMUNITY
Start: 2022-11-07 | End: 1900-01-01

## 2022-11-12 ENCOUNTER — ASC (OUTPATIENT)
Dept: URBAN - METROPOLITAN AREA SURGERY 8 | Facility: SURGERY | Age: 67
Setting detail: OPHTHALMOLOGY
End: 2022-11-12
Payer: COMMERCIAL

## 2022-11-12 DIAGNOSIS — E11.3512: ICD-10-CM

## 2022-11-12 PROCEDURE — 67210 TREATMENT OF RETINAL LESION: CPT | Performed by: SPECIALIST

## 2022-11-12 ASSESSMENT — VISUAL ACUITY
OD_BCVA: 20/30-1
OS_BCVA: 20/25-1

## 2022-11-12 ASSESSMENT — KERATOMETRY
OS_AXISANGLE_DEGREES: 018
METHOD_AUTO_MANUAL: AUTO
OD_K1POWER_DIOPTERS: 43.00
OD_K2POWER_DIOPTERS: 43.50
OS_K1POWER_DIOPTERS: 42.75
OD_AXISANGLE_DEGREES: 005
OS_K2POWER_DIOPTERS: 43.25

## 2022-11-12 ASSESSMENT — REFRACTION_AUTOREFRACTION
OS_SPHERE: +0.25
OS_AXIS: 090
OD_SPHERE: +0.25
OS_CYLINDER: -0.75
OD_CYLINDER: -1.00
OD_AXIS: 096

## 2022-11-12 ASSESSMENT — CONFRONTATIONAL VISUAL FIELD TEST (CVF)
OD_FINDINGS: FULL
OS_FINDINGS: FULL

## 2022-11-12 ASSESSMENT — SPHEQUIV_DERIVED
OD_SPHEQUIV: -0.25
OS_SPHEQUIV: -0.125

## 2022-11-12 ASSESSMENT — AXIALLENGTH_DERIVED
OS_AL: 23.8266
OD_AL: 23.7827

## 2022-11-12 ASSESSMENT — TONOMETRY
OD_IOP_MMHG: 19
OS_IOP_MMHG: 16

## 2022-11-21 ENCOUNTER — APPOINTMENT (OUTPATIENT)
Dept: ENDOCRINOLOGY | Facility: CLINIC | Age: 67
End: 2022-11-21

## 2022-12-05 ENCOUNTER — APPOINTMENT (OUTPATIENT)
Dept: ENDOCRINOLOGY | Facility: CLINIC | Age: 67
End: 2022-12-05

## 2022-12-07 ENCOUNTER — NON-APPOINTMENT (OUTPATIENT)
Age: 67
End: 2022-12-07

## 2022-12-19 ENCOUNTER — APPOINTMENT (OUTPATIENT)
Dept: ENDOCRINOLOGY | Facility: CLINIC | Age: 67
End: 2022-12-19

## 2023-01-06 LAB
HBA1C MFR BLD HPLC: 8.9
LDLC SERPL DIRECT ASSAY-MCNC: 84
MICROALBUMIN/CREAT 24H UR-RTO: 124

## 2023-01-09 ENCOUNTER — APPOINTMENT (OUTPATIENT)
Dept: ENDOCRINOLOGY | Facility: CLINIC | Age: 68
End: 2023-01-09
Payer: MEDICARE

## 2023-01-09 VITALS
HEIGHT: 71 IN | DIASTOLIC BLOOD PRESSURE: 64 MMHG | BODY MASS INDEX: 21.28 KG/M2 | SYSTOLIC BLOOD PRESSURE: 116 MMHG | WEIGHT: 152 LBS | HEART RATE: 95 BPM

## 2023-01-09 PROCEDURE — 99215 OFFICE O/P EST HI 40 MIN: CPT | Mod: 25

## 2023-01-09 PROCEDURE — 95251 CONT GLUC MNTR ANALYSIS I&R: CPT

## 2023-02-06 ENCOUNTER — APPOINTMENT (OUTPATIENT)
Dept: ENDOCRINOLOGY | Facility: CLINIC | Age: 68
End: 2023-02-06
Payer: MEDICARE

## 2023-02-06 PROCEDURE — G0108 DIAB MANAGE TRN  PER INDIV: CPT

## 2023-02-07 ENCOUNTER — NON-APPOINTMENT (OUTPATIENT)
Age: 68
End: 2023-02-07

## 2023-03-08 ENCOUNTER — RX RENEWAL (OUTPATIENT)
Age: 68
End: 2023-03-08

## 2023-04-01 ENCOUNTER — OFFICE (OUTPATIENT)
Dept: URBAN - METROPOLITAN AREA CLINIC 94 | Facility: CLINIC | Age: 68
Setting detail: OPHTHALMOLOGY
End: 2023-04-01
Payer: COMMERCIAL

## 2023-04-01 DIAGNOSIS — E11.3513: ICD-10-CM

## 2023-04-01 PROCEDURE — 92134 CPTRZ OPH DX IMG PST SGM RTA: CPT | Performed by: SPECIALIST

## 2023-04-01 PROCEDURE — 92012 INTRM OPH EXAM EST PATIENT: CPT | Performed by: SPECIALIST

## 2023-04-01 PROCEDURE — 92235 FLUORESCEIN ANGRPH MLTIFRAME: CPT | Performed by: SPECIALIST

## 2023-04-01 ASSESSMENT — KERATOMETRY
METHOD_AUTO_MANUAL: AUTO
OS_AXISANGLE_DEGREES: 018
OS_K1POWER_DIOPTERS: 42.75
OD_K2POWER_DIOPTERS: 43.50
OD_AXISANGLE_DEGREES: 005
OS_K2POWER_DIOPTERS: 43.25
OD_K1POWER_DIOPTERS: 43.00

## 2023-04-01 ASSESSMENT — CONFRONTATIONAL VISUAL FIELD TEST (CVF)
OD_FINDINGS: FULL
OS_FINDINGS: FULL

## 2023-04-01 ASSESSMENT — SPHEQUIV_DERIVED
OD_SPHEQUIV: -0.25
OS_SPHEQUIV: -0.125

## 2023-04-01 ASSESSMENT — REFRACTION_AUTOREFRACTION
OS_CYLINDER: -0.75
OD_CYLINDER: -1.00
OD_AXIS: 096
OD_SPHERE: +0.25
OS_AXIS: 090
OS_SPHERE: +0.25

## 2023-04-01 ASSESSMENT — VISUAL ACUITY
OD_BCVA: 20/30
OS_BCVA: 20/25

## 2023-04-01 ASSESSMENT — AXIALLENGTH_DERIVED
OS_AL: 23.8266
OD_AL: 23.7827

## 2023-05-06 ENCOUNTER — ASC (OUTPATIENT)
Dept: URBAN - METROPOLITAN AREA SURGERY 8 | Facility: SURGERY | Age: 68
Setting detail: OPHTHALMOLOGY
End: 2023-05-06
Payer: COMMERCIAL

## 2023-05-06 DIAGNOSIS — E11.3511: ICD-10-CM

## 2023-05-06 PROCEDURE — 67210 TREATMENT OF RETINAL LESION: CPT | Performed by: SPECIALIST

## 2023-05-06 ASSESSMENT — KERATOMETRY
METHOD_AUTO_MANUAL: AUTO
OD_K1POWER_DIOPTERS: 43.00
OS_AXISANGLE_DEGREES: 018
OD_K2POWER_DIOPTERS: 43.50
OD_AXISANGLE_DEGREES: 005
OS_K2POWER_DIOPTERS: 43.25
OS_K1POWER_DIOPTERS: 42.75

## 2023-05-06 ASSESSMENT — REFRACTION_AUTOREFRACTION
OS_SPHERE: +0.25
OD_CYLINDER: -1.00
OS_AXIS: 090
OD_SPHERE: +0.25
OD_AXIS: 096
OS_CYLINDER: -0.75

## 2023-05-06 ASSESSMENT — TONOMETRY
OD_IOP_MMHG: 16
OS_IOP_MMHG: 17

## 2023-05-06 ASSESSMENT — AXIALLENGTH_DERIVED
OD_AL: 23.7827
OS_AL: 23.8266

## 2023-05-06 ASSESSMENT — SPHEQUIV_DERIVED
OD_SPHEQUIV: -0.25
OS_SPHEQUIV: -0.125

## 2023-05-06 ASSESSMENT — VISUAL ACUITY
OS_BCVA: 20/25
OD_BCVA: 20/30

## 2023-05-16 ENCOUNTER — RESULT CHARGE (OUTPATIENT)
Age: 68
End: 2023-05-16

## 2023-05-16 ENCOUNTER — APPOINTMENT (OUTPATIENT)
Dept: ENDOCRINOLOGY | Facility: CLINIC | Age: 68
End: 2023-05-16
Payer: MEDICARE

## 2023-05-16 VITALS
HEART RATE: 81 BPM | BODY MASS INDEX: 21.28 KG/M2 | SYSTOLIC BLOOD PRESSURE: 126 MMHG | HEIGHT: 71 IN | WEIGHT: 152 LBS | DIASTOLIC BLOOD PRESSURE: 72 MMHG

## 2023-05-16 LAB
GLUCOSE BLDC GLUCOMTR-MCNC: 273
HBA1C MFR BLD HPLC: 9.5
LDLC SERPL DIRECT ASSAY-MCNC: 79

## 2023-05-16 PROCEDURE — 95251 CONT GLUC MNTR ANALYSIS I&R: CPT

## 2023-05-16 PROCEDURE — 82962 GLUCOSE BLOOD TEST: CPT

## 2023-05-16 PROCEDURE — 99215 OFFICE O/P EST HI 40 MIN: CPT | Mod: 25

## 2023-05-20 ENCOUNTER — ASC (OUTPATIENT)
Dept: URBAN - METROPOLITAN AREA SURGERY 8 | Facility: SURGERY | Age: 68
Setting detail: OPHTHALMOLOGY
End: 2023-05-20
Payer: COMMERCIAL

## 2023-05-20 DIAGNOSIS — E11.3512: ICD-10-CM

## 2023-05-20 PROCEDURE — 67210 TREATMENT OF RETINAL LESION: CPT | Performed by: SPECIALIST

## 2023-05-20 ASSESSMENT — AXIALLENGTH_DERIVED
OD_AL: 23.7827
OS_AL: 23.8266

## 2023-05-20 ASSESSMENT — TONOMETRY
OS_IOP_MMHG: 20
OD_IOP_MMHG: 18

## 2023-05-20 ASSESSMENT — REFRACTION_AUTOREFRACTION
OS_CYLINDER: -0.75
OD_SPHERE: +0.25
OS_AXIS: 090
OD_AXIS: 096
OD_CYLINDER: -1.00
OS_SPHERE: +0.25

## 2023-05-20 ASSESSMENT — KERATOMETRY
OD_K2POWER_DIOPTERS: 43.50
OS_K1POWER_DIOPTERS: 42.75
OD_K1POWER_DIOPTERS: 43.00
OD_AXISANGLE_DEGREES: 005
METHOD_AUTO_MANUAL: AUTO
OS_AXISANGLE_DEGREES: 018
OS_K2POWER_DIOPTERS: 43.25

## 2023-05-20 ASSESSMENT — SPHEQUIV_DERIVED
OS_SPHEQUIV: -0.125
OD_SPHEQUIV: -0.25

## 2023-05-20 ASSESSMENT — CONFRONTATIONAL VISUAL FIELD TEST (CVF)
OD_FINDINGS: FULL
OS_FINDINGS: FULL

## 2023-05-20 ASSESSMENT — VISUAL ACUITY
OD_BCVA: 20/30
OS_BCVA: 20/25

## 2023-06-05 ENCOUNTER — APPOINTMENT (OUTPATIENT)
Dept: ENDOCRINOLOGY | Facility: CLINIC | Age: 68
End: 2023-06-05

## 2023-06-13 ENCOUNTER — APPOINTMENT (OUTPATIENT)
Dept: ENDOCRINOLOGY | Facility: CLINIC | Age: 68
End: 2023-06-13
Payer: MEDICARE

## 2023-06-13 PROCEDURE — G0108 DIAB MANAGE TRN  PER INDIV: CPT

## 2023-06-19 ENCOUNTER — NON-APPOINTMENT (OUTPATIENT)
Age: 68
End: 2023-06-19

## 2023-07-21 ENCOUNTER — APPOINTMENT (OUTPATIENT)
Dept: ENDOCRINOLOGY | Facility: CLINIC | Age: 68
End: 2023-07-21
Payer: MEDICARE

## 2023-07-21 VITALS
WEIGHT: 152 LBS | OXYGEN SATURATION: 98 % | DIASTOLIC BLOOD PRESSURE: 60 MMHG | SYSTOLIC BLOOD PRESSURE: 105 MMHG | BODY MASS INDEX: 21.28 KG/M2 | HEART RATE: 80 BPM | HEIGHT: 71 IN

## 2023-07-21 PROCEDURE — 99214 OFFICE O/P EST MOD 30 MIN: CPT

## 2023-07-21 NOTE — HISTORY OF PRESENT ILLNESS
[FreeTextEntry1] : type: 2 but insulin dependent (c peptide 0, TORIBIO negative)\par diabetes since: 38yo\par number of years on insulin: started 2017\par diagnosed by: routine labs\par current severity: uncontrolled\par FH of diabetes: mother, sister\par complications: b/l DR s/p laser surgery\par \par regimen\par Toujeo 14 units QHS \par Humalog 5-7 units with ISF 1:50, also gives corrections in between meals prn but isnt able to speak to how much he gives--often says " it depends" \par \par Uses víctor 2\par Alot of variability\par Average glucose 201\par GMI 8.1%\par Glucose variability 36.0%\par \par Very low 0%\par Low 1%\par Target range 40%\par High 37%\par Very high 22%\par \par Hypoglycemia unawareness but states he is having a lot less since using a libre2\par \par Seems like he would benefit from grayson pen and half dosing (appears very sensitive)\par \par diet: no fast foods, no chinese food, no soda. cut back on apple juice. occ pizza. rice/macaroni/potatoes, meats, low sugar cookies, wheat bread\par exercise: walking\par weight: stable\par \par eye doctor: Q3 months, getting laser treatments for b/l DR\par neuropathy: denies\par CKD: none\par other (PVD/MI/CVA): denies\par non smoker\par \par HLD\par On statin\par \par HTN\par BP IN OFFICE 105/60\par Amlodpine 2.5 mg daily\par Lisinipril-HCTZ 20-12.5MG daily\par \par Vit D Def\par -On weekly 90450 IU supplement

## 2023-07-21 NOTE — ASSESSMENT
[FreeTextEntry1] : \par T1DM\par -Continue rosas us\par -Will facilitate humalog grayson pens---he doesn’t want to waste what he has at home so he can use the rgeular pens when he is using a whole number dosing and the grayson pens when using a half number dosing \par -Educated on diet and given education. discussed complications of diabetes at length including MI/CVA/ESRD/dialysis/blindness/amputations/infections/ED\par -Continue toujeo 14 units\par -continue humalog to 5-7 units with 1:50 ISF over 150, be patient with giving corrections, no sooner then 2 hours post meal injection, should be conservative and give no more then 3-4 units \par -MUST rotate sites\par -continue to follow up with all specialists including ophtho and podiatry \par \par HLD- ldl goal <100, high risk. on statin\par \par HTN- BP acceptable. continue ACEi/hctz/amlodipine\par \par Vitamin D deficiency- check level, on weekly vitamin d\par \par Fasting labs due now- states his pcp will do next week and fax us results \par RTO 8 weeks CDE, 3 months MD\par ---\par With regards to Rosas:\par \par Dx: E10.65\par This patient with diabetes performs 4 glucose checks per day for at least the last 60 days utilizing a home blood glucose monitor. The patient is treated with insulin via 4 injections daily. This patient uses the CGM and glucose monitor readings to frequently adjust their insulin treatment based on set doses adjusted as needed by the physician. In addition, the patient has been to our office for an evaluation of their diabetes control within the past 6 months.\par

## 2023-08-07 ENCOUNTER — RX RENEWAL (OUTPATIENT)
Age: 68
End: 2023-08-07

## 2023-08-12 ENCOUNTER — OFFICE (OUTPATIENT)
Dept: URBAN - METROPOLITAN AREA CLINIC 94 | Facility: CLINIC | Age: 68
Setting detail: OPHTHALMOLOGY
End: 2023-08-12
Payer: COMMERCIAL

## 2023-08-12 DIAGNOSIS — E11.3513: ICD-10-CM

## 2023-08-12 PROCEDURE — 99024 POSTOP FOLLOW-UP VISIT: CPT | Performed by: SPECIALIST

## 2023-08-12 PROCEDURE — 92134 CPTRZ OPH DX IMG PST SGM RTA: CPT | Performed by: SPECIALIST

## 2023-08-12 PROCEDURE — 92235 FLUORESCEIN ANGRPH MLTIFRAME: CPT | Performed by: SPECIALIST

## 2023-08-12 ASSESSMENT — TONOMETRY
OD_IOP_MMHG: 18
OS_IOP_MMHG: 20

## 2023-08-12 ASSESSMENT — KERATOMETRY
OS_K1POWER_DIOPTERS: 42.75
OS_K2POWER_DIOPTERS: 43.25
METHOD_AUTO_MANUAL: AUTO
OD_K2POWER_DIOPTERS: 43.50
OS_AXISANGLE_DEGREES: 018
OD_K1POWER_DIOPTERS: 43.00
OD_AXISANGLE_DEGREES: 005

## 2023-08-12 ASSESSMENT — REFRACTION_AUTOREFRACTION
OS_AXIS: 090
OD_AXIS: 096
OS_CYLINDER: -0.75
OD_CYLINDER: -1.00
OD_SPHERE: +0.25
OS_SPHERE: +0.25

## 2023-08-12 ASSESSMENT — SPHEQUIV_DERIVED
OD_SPHEQUIV: -0.25
OS_SPHEQUIV: -0.125

## 2023-08-12 ASSESSMENT — CONFRONTATIONAL VISUAL FIELD TEST (CVF)
OS_FINDINGS: FULL
OD_FINDINGS: FULL

## 2023-08-12 ASSESSMENT — VISUAL ACUITY
OS_BCVA: 20/25
OD_BCVA: 20/30

## 2023-08-12 ASSESSMENT — AXIALLENGTH_DERIVED
OD_AL: 23.7827
OS_AL: 23.8266

## 2023-08-21 ENCOUNTER — ASC (OUTPATIENT)
Dept: URBAN - METROPOLITAN AREA SURGERY 8 | Facility: SURGERY | Age: 68
Setting detail: OPHTHALMOLOGY
End: 2023-08-21
Payer: COMMERCIAL

## 2023-08-21 DIAGNOSIS — E11.3511: ICD-10-CM

## 2023-08-21 PROCEDURE — 67210 TREATMENT OF RETINAL LESION: CPT | Performed by: SPECIALIST

## 2023-08-21 ASSESSMENT — REFRACTION_AUTOREFRACTION
OD_AXIS: 096
OS_CYLINDER: -0.75
OS_AXIS: 090
OD_SPHERE: +0.25
OD_CYLINDER: -1.00
OS_SPHERE: +0.25

## 2023-08-21 ASSESSMENT — AXIALLENGTH_DERIVED
OS_AL: 23.8266
OD_AL: 23.7827

## 2023-08-21 ASSESSMENT — SPHEQUIV_DERIVED
OS_SPHEQUIV: -0.125
OD_SPHEQUIV: -0.25

## 2023-08-21 ASSESSMENT — VISUAL ACUITY
OD_BCVA: 20/30
OS_BCVA: 20/30

## 2023-08-21 ASSESSMENT — TONOMETRY
OD_IOP_MMHG: 18
OS_IOP_MMHG: 14

## 2023-08-21 ASSESSMENT — KERATOMETRY
OS_K1POWER_DIOPTERS: 42.75
OS_AXISANGLE_DEGREES: 018
METHOD_AUTO_MANUAL: AUTO
OD_K2POWER_DIOPTERS: 43.50
OS_K2POWER_DIOPTERS: 43.25
OD_K1POWER_DIOPTERS: 43.00
OD_AXISANGLE_DEGREES: 005

## 2023-08-21 ASSESSMENT — CONFRONTATIONAL VISUAL FIELD TEST (CVF)
OD_FINDINGS: FULL
OS_FINDINGS: FULL

## 2023-09-05 ENCOUNTER — APPOINTMENT (OUTPATIENT)
Dept: ENDOCRINOLOGY | Facility: CLINIC | Age: 68
End: 2023-09-05
Payer: MEDICARE

## 2023-09-05 PROCEDURE — G0108 DIAB MANAGE TRN  PER INDIV: CPT

## 2023-09-23 ENCOUNTER — ASC (OUTPATIENT)
Dept: URBAN - METROPOLITAN AREA SURGERY 8 | Facility: SURGERY | Age: 68
Setting detail: OPHTHALMOLOGY
End: 2023-09-23
Payer: COMMERCIAL

## 2023-09-23 DIAGNOSIS — E11.3512: ICD-10-CM

## 2023-09-23 PROCEDURE — 67210 TREATMENT OF RETINAL LESION: CPT | Performed by: SPECIALIST

## 2023-09-23 ASSESSMENT — KERATOMETRY
OD_AXISANGLE_DEGREES: 005
OS_AXISANGLE_DEGREES: 018
OS_K1POWER_DIOPTERS: 42.75
OD_K1POWER_DIOPTERS: 43.00
OD_K2POWER_DIOPTERS: 43.50
METHOD_AUTO_MANUAL: AUTO
OS_K2POWER_DIOPTERS: 43.25

## 2023-09-23 ASSESSMENT — AXIALLENGTH_DERIVED
OS_AL: 23.8266
OD_AL: 23.7827

## 2023-09-23 ASSESSMENT — VISUAL ACUITY
OD_BCVA: 20/30
OS_BCVA: 20/30

## 2023-09-23 ASSESSMENT — REFRACTION_AUTOREFRACTION
OD_AXIS: 096
OS_AXIS: 090
OS_CYLINDER: -0.75
OD_SPHERE: +0.25
OD_CYLINDER: -1.00
OS_SPHERE: +0.25

## 2023-09-23 ASSESSMENT — SPHEQUIV_DERIVED
OD_SPHEQUIV: -0.25
OS_SPHEQUIV: -0.125

## 2023-09-23 ASSESSMENT — TONOMETRY
OS_IOP_MMHG: 15
OD_IOP_MMHG: 17

## 2023-09-29 LAB
HBA1C MFR BLD HPLC: 8.8
LDLC SERPL DIRECT ASSAY-MCNC: 69
MICROALBUMIN/CREAT 24H UR-RTO: NORMAL

## 2023-10-02 ENCOUNTER — APPOINTMENT (OUTPATIENT)
Dept: ENDOCRINOLOGY | Facility: CLINIC | Age: 68
End: 2023-10-02
Payer: MEDICARE

## 2023-10-02 VITALS
HEART RATE: 98 BPM | HEIGHT: 71 IN | DIASTOLIC BLOOD PRESSURE: 80 MMHG | SYSTOLIC BLOOD PRESSURE: 118 MMHG | WEIGHT: 153 LBS | BODY MASS INDEX: 21.42 KG/M2

## 2023-10-02 PROCEDURE — 95251 CONT GLUC MNTR ANALYSIS I&R: CPT

## 2023-10-02 PROCEDURE — 99215 OFFICE O/P EST HI 40 MIN: CPT | Mod: 25

## 2023-11-14 ENCOUNTER — NON-APPOINTMENT (OUTPATIENT)
Age: 68
End: 2023-11-14

## 2023-11-14 ENCOUNTER — APPOINTMENT (OUTPATIENT)
Dept: ENDOCRINOLOGY | Facility: CLINIC | Age: 68
End: 2023-11-14
Payer: MEDICARE

## 2023-11-14 PROCEDURE — G0108 DIAB MANAGE TRN  PER INDIV: CPT

## 2024-01-04 LAB
HBA1C MFR BLD HPLC: 8.8
LDLC SERPL DIRECT ASSAY-MCNC: 73

## 2024-01-05 ENCOUNTER — APPOINTMENT (OUTPATIENT)
Dept: ENDOCRINOLOGY | Facility: CLINIC | Age: 69
End: 2024-01-05
Payer: MEDICARE

## 2024-01-05 VITALS
BODY MASS INDEX: 21.98 KG/M2 | OXYGEN SATURATION: 99 % | WEIGHT: 157 LBS | HEIGHT: 71 IN | DIASTOLIC BLOOD PRESSURE: 50 MMHG | SYSTOLIC BLOOD PRESSURE: 90 MMHG | HEART RATE: 95 BPM

## 2024-01-05 PROCEDURE — 99214 OFFICE O/P EST MOD 30 MIN: CPT | Mod: 25

## 2024-01-05 PROCEDURE — 95251 CONT GLUC MNTR ANALYSIS I&R: CPT

## 2024-01-05 NOTE — REVIEW OF SYSTEMS
[Fatigue] : no fatigue [Recent Weight Gain (___ Lbs)] : no recent weight gain [Recent Weight Loss (___ Lbs)] : no recent weight loss [Visual Field Defect] : no visual field defect [Blurred Vision] : no blurred vision [Dysphagia] : no dysphagia [Neck Pain] : no neck pain [Dysphonia] : no dysphonia [Chest Pain] : no chest pain [Palpitations] : no palpitations [Shortness Of Breath] : no shortness of breath [Constipation] : no constipation [Diarrhea] : no diarrhea [Polyuria] : no polyuria [Polydipsia] : no polydipsia

## 2024-01-05 NOTE — ASSESSMENT
[FreeTextEntry1] :  T1DM  -Continue rosas use  -Continue Humalog grayson pens  -Educated on diet and given education. discussed complications of diabetes at length including MI/CVA/ESRD/dialysis/blindness/amputations/infections/ED  -Decrease toujeo back to 14 units as he is having lows at 8 am approx 50% of the time (highs are diet related)  -continue humalog to 5-7 units with 1:50 ISF over 150, be patient with giving corrections, no sooner then 2 hours post meal injection, should be conservative and give no more then 3-4 units  -MUST rotate sites  -continue to follow up with all specialists including ophtho and podiatry    HLD- ldl goal <100, high risk. on statin    HTN- BP acceptable. continue ACEi/hctz/amlodipine    Vitamin D deficiency- check level, on weekly vitamin d    Fasting labs due now- states his pcp will do next week and fax us results  RTO 8 weeks MD  ---  With regards to Rosas:    Dx: E10.65  This patient with diabetes performs 4 glucose checks per day for at least the last 60 days utilizing a home blood glucose monitor. The patient is treated with insulin via 4 injections daily. This patient uses the CGM and glucose monitor readings to frequently adjust their insulin treatment based on set doses adjusted as needed by the physician. In addition, the patient has been to our office for an evaluation of their diabetes control within the past 6 months.

## 2024-01-05 NOTE — HISTORY OF PRESENT ILLNESS
[FreeTextEntry1] : Interval history: no events variable blood sugars on víctor download due to holidays/dietary indescrition  type: 2 but insulin dependent (c peptide 0, TORIBIO negative) diabetes since: 40yo number of years on insulin: started 2017 diagnosed by: routine labs current severity: uncontrolled FH of diabetes: mother, sister complications: b/l  s/p laser surgery  regimen Toujeo 16  units qhs Humalog 5-7 units with ISF 1:50 above 150 and 3 additional units when consuming pasta  Uses víctor 2 Alot of variability Average glucose 193 GMI 7.9% Glucose variability 40.4%  Very low 0% Low 1% Target range 50% High 24% Very high 25%  Hypoglycemia unawareness but states he is having a lot less since using a libre2  Interpretation: some lows at 8 am several days in a low  diet: no fast foods, no chinese food, no soda. cut back on apple juice. occ pizza. rice/macaroni/potatoes, meats, low sugar cookies, wheat bread exercise: walking weight: stable  eye doctor: Q3 months, getting laser treatments for b/l  neuropathy: denies CKD: none other (PVD/MI/CVA): denies non smoker  HLD On statin  HTN BP IN OFFICE 90/50 Amlodpine 2.5 mg daily Lisinipril-HCTZ 20-12.5MG daily Controlled by PCP  Vit D Def -On weekly 96359 IU supplement

## 2024-01-20 ENCOUNTER — OFFICE (OUTPATIENT)
Dept: URBAN - METROPOLITAN AREA CLINIC 94 | Facility: CLINIC | Age: 69
Setting detail: OPHTHALMOLOGY
End: 2024-01-20
Payer: MEDICARE

## 2024-01-20 ENCOUNTER — ASC (OUTPATIENT)
Dept: URBAN - METROPOLITAN AREA SURGERY 8 | Facility: SURGERY | Age: 69
Setting detail: OPHTHALMOLOGY
End: 2024-01-20
Payer: MEDICARE

## 2024-01-20 DIAGNOSIS — E11.3511: ICD-10-CM

## 2024-01-20 DIAGNOSIS — E11.3513: ICD-10-CM

## 2024-01-20 PROCEDURE — 92014 COMPRE OPH EXAM EST PT 1/>: CPT | Mod: 57 | Performed by: SPECIALIST

## 2024-01-20 PROCEDURE — 92134 CPTRZ OPH DX IMG PST SGM RTA: CPT | Performed by: SPECIALIST

## 2024-01-20 PROCEDURE — 92235 FLUORESCEIN ANGRPH MLTIFRAME: CPT | Performed by: SPECIALIST

## 2024-01-20 PROCEDURE — 67210 TREATMENT OF RETINAL LESION: CPT | Mod: RT | Performed by: SPECIALIST

## 2024-01-20 ASSESSMENT — CONFRONTATIONAL VISUAL FIELD TEST (CVF)
OD_FINDINGS: FULL
OS_FINDINGS: FULL

## 2024-01-20 ASSESSMENT — SPHEQUIV_DERIVED
OD_SPHEQUIV: -0.25
OS_SPHEQUIV: -0.125

## 2024-01-20 ASSESSMENT — REFRACTION_AUTOREFRACTION
OD_AXIS: 096
OS_AXIS: 090
OD_SPHERE: +0.25
OS_CYLINDER: -0.75
OS_SPHERE: +0.25
OD_CYLINDER: -1.00

## 2024-02-07 ENCOUNTER — RX RENEWAL (OUTPATIENT)
Age: 69
End: 2024-02-07

## 2024-02-07 RX ORDER — FLASH GLUCOSE SENSOR
KIT MISCELLANEOUS
Qty: 6 | Refills: 2 | Status: ACTIVE | COMMUNITY
Start: 2022-10-21 | End: 1900-01-01

## 2024-02-08 ENCOUNTER — OFFICE (OUTPATIENT)
Dept: URBAN - METROPOLITAN AREA CLINIC 94 | Facility: CLINIC | Age: 69
Setting detail: OPHTHALMOLOGY
End: 2024-02-08
Payer: MEDICARE

## 2024-02-08 DIAGNOSIS — E11.3512: ICD-10-CM

## 2024-02-08 PROCEDURE — 67210 TREATMENT OF RETINAL LESION: CPT | Mod: 79,LT | Performed by: SPECIALIST

## 2024-02-08 ASSESSMENT — SPHEQUIV_DERIVED
OS_SPHEQUIV: -0.125
OD_SPHEQUIV: -0.25

## 2024-02-08 ASSESSMENT — REFRACTION_AUTOREFRACTION
OS_SPHERE: +0.25
OS_CYLINDER: -0.75
OS_AXIS: 090
OD_AXIS: 096
OD_CYLINDER: -1.00
OD_SPHERE: +0.25

## 2024-02-08 ASSESSMENT — CONFRONTATIONAL VISUAL FIELD TEST (CVF)
OS_FINDINGS: FULL
OD_FINDINGS: FULL

## 2024-03-04 ENCOUNTER — APPOINTMENT (OUTPATIENT)
Dept: ENDOCRINOLOGY | Facility: CLINIC | Age: 69
End: 2024-03-04
Payer: MEDICARE

## 2024-03-04 VITALS
HEIGHT: 71 IN | WEIGHT: 157 LBS | DIASTOLIC BLOOD PRESSURE: 62 MMHG | HEART RATE: 98 BPM | SYSTOLIC BLOOD PRESSURE: 116 MMHG | BODY MASS INDEX: 21.98 KG/M2 | OXYGEN SATURATION: 98 %

## 2024-03-04 LAB — GLUCOSE BLDC GLUCOMTR-MCNC: 140

## 2024-03-04 PROCEDURE — 95251 CONT GLUC MNTR ANALYSIS I&R: CPT

## 2024-03-04 PROCEDURE — 82962 GLUCOSE BLOOD TEST: CPT

## 2024-03-04 PROCEDURE — 99215 OFFICE O/P EST HI 40 MIN: CPT

## 2024-03-04 RX ORDER — INSULIN LISPRO 100 [IU]/ML
100 INJECTION, SOLUTION SUBCUTANEOUS DAILY
Qty: 3 | Refills: 1 | Status: ACTIVE | COMMUNITY
Start: 2023-07-21

## 2024-03-04 NOTE — ASSESSMENT
[Long Term Vascular Complications] : long term vascular complications of diabetes [Importance of Diet and Exercise] : importance of diet and exercise to improve glycemic control, achieve weight loss and improve cardiovascular health [Self Monitoring of Blood Glucose] : self monitoring of blood glucose [Insulin Self-Administration] : insulin self-administration [Retinopathy Screening] : Patient was referred to ophthalmology for retinopathy screening [FreeTextEntry1] : 68M AA w/ longstanding T1DM cb b/l DR, prostate cancer on XRT, HTN, HLD and vit d def here for follow up rtc in 6 weeks with sandra with the pumps rtc in 3 months with franky rtc in 6 months with me  T1DM- uncontrolled due to snacking (needs to work on diet), goal A1c<7 based on age. -neg TORIBIO but undetectable cpeptide -Likely due to dietary indiscretion and nature of disease--> no more cereal or juice, dietary booklet given -Check sugars at least 4x daily.  -seeing eye doctor in may -continue toujeo 14 units -continue humalog 8-9 with ISF 1:50 above 150 --> should to learn carb counting to figure out ratios -if having pasta, take extra 3units -cake/pie/donuts, take humalog 3 units discussed possible insulin pump  -MUST rotate sites -see cde to see if interested in pump therapy -eventually will switch to dexcom  HLD- ldl goal <100, high risk. on statin  HTN- BP acceptable. continue ACEi/hctz/amlodipine  Vitamin D deficiency- vit d is okay, on weekly vitamin d   --- With regards to Rosas:  Dx: E10.65 This patient with diabetes performs 4 glucose checks per day for at least the last 60 days utilizing a home blood glucose monitor. The patient is treated with insulin via 4 injections daily. This patient uses the CGM and glucose monitor readings to frequently adjust their insulin treatment based on set doses adjusted as needed by the physician. In addition, the patient has been to our office for an evaluation of their diabetes control within the past 6 months.

## 2024-03-04 NOTE — HISTORY OF PRESENT ILLNESS
[FreeTextEntry1] : here for T1DM has prostate cancer and getting XRT  type: 1 (cpeptide undetectable, TORIBIO neg) diabetes since: 40yo number of years on insulin: started 2017 diagnosed by: routine labs current severity: uncontrolled FH of diabetes: mother, sister complications: b/l  s/p laser surgery  interval history chart reviewed has prostate cancer s/p radiation treatments 11/2023, was told that patient did well. no chemo feels okay having less lows  labs reviewed- tsh 2, erika neg, ldl 67, fasting glucose 221, sCr 1.2/GFR 66, a1c 9.1  regimen toujeo 14 units humalog grayson 8-9units with ISF 1:50 above 150   FS in office: 140 has víctor 14day but uses reader avg 189 highs: 49% in target: 49% lows: 2%  diet: no fast foods, no chinese food, no regular soda. no apple juice. has occ diet coke. occ pizza. rice/macaroni/potatoes, meats, low sugar cookies, wheat bread. having donuts/cake /ice cream exercise: walking weight: stable  eye doctor: Q3 months, getting laser treatments for b/l  neuropathy: denies CKD: none other (PVD/MI/CVA): denies non smoker

## 2024-03-04 NOTE — PHYSICAL EXAM
[Well Nourished] : well nourished [Alert] : alert [No Acute Distress] : no acute distress [Normal Sclera/Conjunctiva] : normal sclera/conjunctiva [Well Developed] : well developed [EOMI] : extra ocular movement intact [No Proptosis] : no proptosis [Normal Oropharynx] : the oropharynx was normal [Thyroid Not Enlarged] : the thyroid was not enlarged [No Thyroid Nodules] : no palpable thyroid nodules [No Respiratory Distress] : no respiratory distress [Clear to Auscultation] : lungs were clear to auscultation bilaterally [No Accessory Muscle Use] : no accessory muscle use [Normal S1, S2] : normal S1 and S2 [Normal Rate] : heart rate was normal [Normal Bowel Sounds] : normal bowel sounds [Regular Rhythm] : with a regular rhythm [Not Distended] : not distended [Not Tender] : non-tender [Soft] : abdomen soft [No Stigmata of Cushings Syndrome] : no stigmata of Cushings Syndrome [Normal Gait] : normal gait [No Rash] : no rash [Normal Reflexes] : deep tendon reflexes were 2+ and symmetric [Oriented x3] : oriented to person, place, and time [No Tremors] : no tremors [Normal Mood] : the mood was normal [Normal Affect] : the affect was normal [Acanthosis Nigricans] : no acanthosis nigricans

## 2024-04-02 ENCOUNTER — APPOINTMENT (OUTPATIENT)
Dept: ENDOCRINOLOGY | Facility: CLINIC | Age: 69
End: 2024-04-02
Payer: MEDICARE

## 2024-04-02 PROCEDURE — G0108 DIAB MANAGE TRN  PER INDIV: CPT

## 2024-04-16 ENCOUNTER — APPOINTMENT (OUTPATIENT)
Dept: ENDOCRINOLOGY | Facility: CLINIC | Age: 69
End: 2024-04-16
Payer: MEDICARE

## 2024-04-16 PROCEDURE — G0108 DIAB MANAGE TRN  PER INDIV: CPT

## 2024-04-21 NOTE — ED PROVIDER NOTE - PROGRESS NOTE DETAILS
normal
pt with weakness, cp, and sob x 2 wks s/p change in his DM meds.  pt also with elevated BS.  will admit for further acs and hypoglycemia w/u.  case d/w Swapnil and will admit

## 2024-05-31 LAB
HBA1C MFR BLD HPLC: 8.9
LDLC SERPL DIRECT ASSAY-MCNC: 85

## 2024-06-03 ENCOUNTER — APPOINTMENT (OUTPATIENT)
Dept: ENDOCRINOLOGY | Facility: CLINIC | Age: 69
End: 2024-06-03
Payer: MEDICARE

## 2024-06-03 VITALS
SYSTOLIC BLOOD PRESSURE: 116 MMHG | HEIGHT: 71 IN | WEIGHT: 156 LBS | DIASTOLIC BLOOD PRESSURE: 72 MMHG | OXYGEN SATURATION: 97 % | BODY MASS INDEX: 21.84 KG/M2 | HEART RATE: 86 BPM

## 2024-06-03 DIAGNOSIS — E78.5 HYPERLIPIDEMIA, UNSPECIFIED: ICD-10-CM

## 2024-06-03 DIAGNOSIS — E55.9 VITAMIN D DEFICIENCY, UNSPECIFIED: ICD-10-CM

## 2024-06-03 DIAGNOSIS — E10.649 TYPE 1 DIABETES MELLITUS WITH HYPOGLYCEMIA W/OUT COMA: ICD-10-CM

## 2024-06-03 DIAGNOSIS — I10 ESSENTIAL (PRIMARY) HYPERTENSION: ICD-10-CM

## 2024-06-03 DIAGNOSIS — E10.65 TYPE 1 DIABETES MELLITUS WITH HYPERGLYCEMIA: ICD-10-CM

## 2024-06-03 LAB — GLUCOSE BLDC GLUCOMTR-MCNC: 126

## 2024-06-03 PROCEDURE — 95251 CONT GLUC MNTR ANALYSIS I&R: CPT

## 2024-06-03 PROCEDURE — 99214 OFFICE O/P EST MOD 30 MIN: CPT

## 2024-06-03 PROCEDURE — 82962 GLUCOSE BLOOD TEST: CPT

## 2024-06-03 PROCEDURE — G2211 COMPLEX E/M VISIT ADD ON: CPT

## 2024-06-03 RX ORDER — INSULIN GLARGINE 300 U/ML
300 INJECTION, SOLUTION SUBCUTANEOUS
Qty: 1 | Refills: 1 | Status: ACTIVE | COMMUNITY
Start: 1900-01-01 | End: 1900-01-01

## 2024-06-03 RX ORDER — INSULIN GLARGINE 300 U/ML
300 INJECTION, SOLUTION SUBCUTANEOUS
Qty: 4 | Refills: 0 | Status: COMPLETED | COMMUNITY
Start: 2022-10-13 | End: 2024-06-03

## 2024-06-03 NOTE — HISTORY OF PRESENT ILLNESS
[FreeTextEntry1] : Type: 1 (C-peptide undetectable, TORIBIO neg) Diabetes since: age 39 Number of years on insulin: started 2017 Diagnosed by: routine labs Current severity: uncontrolled FH of diabetes: mother, sister Complications: B/L  s/p laser surgery  INTERVAL HISTORY: was unable to afford pump- wanted [employer]. Insurance covered some of it but not enough. Is currently applying for Medicaid and then plans to get pump. Has prostate cancer s/p radiation 2023. Did not get chemotherapy. Has follow-up scheduled with oncology.  Current regimen for glycemic control: Toujeo 14 units daily--->no longer covered by insurance Humalog Denny Kwik pen, 9 units AC with ISF 1:50 for BG >150. Add 3 units extra for pasta. Sometimes takes insulin for high sugar snacks/desserts if BG is high prior to eating.  Current B SMBG with Freestyle Rosas. Reviewed CGMS.  Avg  CV 43.1% Very high 16% High 27% Target 49% Low 7% Very low 1%  Interpretation: highly variable. Sharp drop in BG with prandial insulin, followed by hyperglycemia due to overtreatment of lows.  Diet: Cut down on junk snacks recently (donuts/cakes/ice cream). Routine meals, eats the same thing all the time (Beef, fish, chicken, rice, vegetables). No fast foods, no Chinese food, no regular soda. No apple juice. Has occ diet coke, occ pizza. Exercise: walking occasionally Weight: stable  Eye doctor: q 3-4 months, has appointment next week. B/L , getting laser therapy.  Neuropathy: denies CKD: none Other (PVD/MI/CVA): denies Non-smoker

## 2024-06-03 NOTE — REVIEW OF SYSTEMS
[Recent Weight Gain (___ Lbs)] : no recent weight gain [Recent Weight Loss (___ Lbs)] : no recent weight loss [Chest Pain] : no chest pain [Palpitations] : no palpitations [Shortness Of Breath] : no shortness of breath [Nausea] : no nausea [Abdominal Pain] : no abdominal pain [Vomiting] : no vomiting [Polyuria] : no polyuria [Pain/Numbness of Digits] : no pain/numbness of digits [Polydipsia] : no polydipsia

## 2024-06-03 NOTE — ASSESSMENT
[FreeTextEntry1] : 68 year old male with T1DM (neg TORIBIO, C-peptide undetectable) with associated DR, also with hypertension and hyperlipidemia. He has h/o prostate cancer s/p XRT. Glycemic control is poor.  1. T1DM- CGMS reveals avg  with 49% in range and 8% low. BG is highly variable with lows after meals followed by hyperglycemia d/t overtreatment of hypoglycemia. -Reduce Humalog to 7 units TID AC plus 1:50 >150. Written sliding scale provided for patient. -Continue Toujeo 14 units daily. -Reviewed 15/15 rule for treatment of hypoglycemia. Instead of having a full peanut butter sandwich when BG is low, try half. -Would benefit from CSII with AID but too costly right now. Can try again for pump when patient is on Medicaid. -Continue SMBG with Freestyle Rosas. -UTD with eye exam. -Repeat A1c and KRISTINA now.  2. Hypertension- controlled. -Continue current regimen.  3. Hyperlipidemia -Continue statin. -Repeat lipids now. -----------------------------------------------  This patient with diabetes: -Injects insulin 1+ times daily. -Is currently on CGM, testing glucose continuously. -Has been seen in the office by a provider within the last six months to review CGM data with their provider.  CGM Is medically necessary for this patient:  -CGM will improve/maintain A1c. -CGM will reduce hypoglycemic events.  Rosas and Dexcom each require one test strip daily to use in case of sensor failure or for blood glucose verification or during sensor warm up.

## 2024-06-17 ENCOUNTER — ASC (OUTPATIENT)
Dept: URBAN - METROPOLITAN AREA SURGERY 8 | Facility: SURGERY | Age: 69
Setting detail: OPHTHALMOLOGY
End: 2024-06-17
Payer: MEDICARE

## 2024-06-17 ENCOUNTER — OFFICE (OUTPATIENT)
Dept: URBAN - METROPOLITAN AREA CLINIC 94 | Facility: CLINIC | Age: 69
Setting detail: OPHTHALMOLOGY
End: 2024-06-17
Payer: MEDICARE

## 2024-06-17 DIAGNOSIS — E11.3511: ICD-10-CM

## 2024-06-17 DIAGNOSIS — E11.3513: ICD-10-CM

## 2024-06-17 PROCEDURE — 92235 FLUORESCEIN ANGRPH MLTIFRAME: CPT | Performed by: SPECIALIST

## 2024-06-17 PROCEDURE — 92134 CPTRZ OPH DX IMG PST SGM RTA: CPT | Performed by: SPECIALIST

## 2024-06-17 PROCEDURE — 92014 COMPRE OPH EXAM EST PT 1/>: CPT | Mod: 57 | Performed by: SPECIALIST

## 2024-06-17 PROCEDURE — 67210 TREATMENT OF RETINAL LESION: CPT | Mod: RT | Performed by: SPECIALIST

## 2024-06-17 ASSESSMENT — CONFRONTATIONAL VISUAL FIELD TEST (CVF)
OS_FINDINGS: FULL
OD_FINDINGS: FULL

## 2024-07-15 ENCOUNTER — ASC (OUTPATIENT)
Dept: URBAN - METROPOLITAN AREA SURGERY 8 | Facility: SURGERY | Age: 69
Setting detail: OPHTHALMOLOGY
End: 2024-07-15
Payer: MEDICARE

## 2024-07-15 DIAGNOSIS — E11.3513: ICD-10-CM

## 2024-07-15 DIAGNOSIS — E11.3512: ICD-10-CM

## 2024-07-15 PROCEDURE — 67210 TREATMENT OF RETINAL LESION: CPT | Mod: 79,LT | Performed by: SPECIALIST

## 2024-07-15 PROCEDURE — 99024 POSTOP FOLLOW-UP VISIT: CPT | Performed by: SPECIALIST

## 2024-07-15 ASSESSMENT — CONFRONTATIONAL VISUAL FIELD TEST (CVF)
OS_FINDINGS: FULL
OD_FINDINGS: FULL

## 2024-09-03 ENCOUNTER — APPOINTMENT (OUTPATIENT)
Dept: ENDOCRINOLOGY | Facility: CLINIC | Age: 69
End: 2024-09-03
Payer: MEDICARE

## 2024-09-03 ENCOUNTER — RX RENEWAL (OUTPATIENT)
Age: 69
End: 2024-09-03

## 2024-09-03 VITALS
HEIGHT: 71 IN | DIASTOLIC BLOOD PRESSURE: 62 MMHG | SYSTOLIC BLOOD PRESSURE: 124 MMHG | HEART RATE: 93 BPM | OXYGEN SATURATION: 99 % | BODY MASS INDEX: 21.98 KG/M2 | WEIGHT: 157 LBS

## 2024-09-03 DIAGNOSIS — E55.9 VITAMIN D DEFICIENCY, UNSPECIFIED: ICD-10-CM

## 2024-09-03 DIAGNOSIS — E10.649 TYPE 1 DIABETES MELLITUS WITH HYPOGLYCEMIA W/OUT COMA: ICD-10-CM

## 2024-09-03 DIAGNOSIS — E10.65 TYPE 1 DIABETES MELLITUS WITH HYPERGLYCEMIA: ICD-10-CM

## 2024-09-03 DIAGNOSIS — E78.5 HYPERLIPIDEMIA, UNSPECIFIED: ICD-10-CM

## 2024-09-03 DIAGNOSIS — I10 ESSENTIAL (PRIMARY) HYPERTENSION: ICD-10-CM

## 2024-09-03 LAB — GLUCOSE BLDC GLUCOMTR-MCNC: 156

## 2024-09-03 PROCEDURE — 82962 GLUCOSE BLOOD TEST: CPT

## 2024-09-03 PROCEDURE — 36415 COLL VENOUS BLD VENIPUNCTURE: CPT

## 2024-09-03 PROCEDURE — 99215 OFFICE O/P EST HI 40 MIN: CPT

## 2024-09-03 NOTE — PHYSICAL EXAM
[Alert] : alert [Well Nourished] : well nourished [No Acute Distress] : no acute distress [Well Developed] : well developed [Normal Sclera/Conjunctiva] : normal sclera/conjunctiva [EOMI] : extra ocular movement intact [No Proptosis] : no proptosis [Normal Oropharynx] : the oropharynx was normal [Thyroid Not Enlarged] : the thyroid was not enlarged [No Thyroid Nodules] : no palpable thyroid nodules [No Respiratory Distress] : no respiratory distress [No Accessory Muscle Use] : no accessory muscle use [Clear to Auscultation] : lungs were clear to auscultation bilaterally [Normal S1, S2] : normal S1 and S2 [Normal Rate] : heart rate was normal [Regular Rhythm] : with a regular rhythm [Normal Bowel Sounds] : normal bowel sounds [Not Tender] : non-tender [Not Distended] : not distended [Soft] : abdomen soft [No Stigmata of Cushings Syndrome] : no stigmata of Cushings Syndrome [Normal Gait] : normal gait [No Rash] : no rash [Normal Reflexes] : deep tendon reflexes were 2+ and symmetric [No Tremors] : no tremors [Oriented x3] : oriented to person, place, and time [Normal Affect] : the affect was normal [Normal Mood] : the mood was normal [Acanthosis Nigricans] : no acanthosis nigricans

## 2024-09-03 NOTE — HISTORY OF PRESENT ILLNESS
[FreeTextEntry1] : here for T1DM has prostate cancer and getting XRT  type: 1 (cpeptide undetectable, TORIBIO neg) diabetes since: 38yo number of years on insulin: started 2017 diagnosed by: routine labs current severity: uncontrolled FH of diabetes: mother, sister complications: b/l  s/p laser surgery  interval history chart reviewed has prostate cancer s/p radiation treatments 11/2023, was told that patient did well. no chemo. will be seeing them next month  feels okay having more lows  no labs  regimen toujeo 14 units humalog grayson pens 2-3 breakfast, 4-5 lunch, 8-9 dinner with ISF 1:50 above 150 pump too expensive right now, needs medicaid  FS in office: 156 has víctor 14day but uses reader avg 138 highs: 22% in target: 65% lows: 13%  diet: no fast foods, no chinese food, no regular soda. no apple juice. has occ diet coke. occ pizza. rice/macaroni/potatoes, meats, low sugar cookies, wheat bread. having donuts/cake /ice cream exercise: walking weight: stable  eye doctor: Q3 months, getting laser treatments for b/l  neuropathy: denies CKD: none other (PVD/MI/CVA): denies non smoker

## 2024-09-03 NOTE — ASSESSMENT
[Long Term Vascular Complications] : long term vascular complications of diabetes [Importance of Diet and Exercise] : importance of diet and exercise to improve glycemic control, achieve weight loss and improve cardiovascular health [Self Monitoring of Blood Glucose] : self monitoring of blood glucose [Insulin Self-Administration] : insulin self-administration [Retinopathy Screening] : Patient was referred to ophthalmology for retinopathy screening [FreeTextEntry1] : 69M AA w/ longstanding T1DM cb b/l , prostate cancer on XRT, HTN, HLD and vit d def here for follow up rtc in 3 months with brigida rtc in 6 months with me labs done in the office today  T1DM- uncontrolled due to snacking (needs to work on diet), goal A1c<7 based on age. -neg TORIBIO but undetectable cpeptide -Likely due to dietary indiscretion and nature of disease--> no more cereal or juice, dietary booklet given -on Libre2  -seeing eye doctor regularly -change to toujeo 12 units -continue humalog grayson pens 2-3/4-5/8-9 with ISF 1:50 above 150 --> should to learn carb counting to figure out ratios -if having pasta, take extra 3units -cake/pie/donuts, take humalog 3 units discussed insulin pump but still too expensive even with insurance coverage  -MUST rotate sites -see cde to see if interested in pump therapy -eventually will switch to dexcom  HLD- ldl goal <100, high risk. on statin  HTN- BP acceptable. continue ACEi/hctz/amlodipine  Vitamin D deficiency- vit d is okay, on weekly vitamin d   --- This patient with diabetes - Is injecting insulin 3 or more times per day - Is currently on CGM, testing glucose continuously - Has been seen in the office by a provider within the last six months to review CGM data with their provider - Is adjusting multi-dose insulin daily using a sliding scale or correction factor as documented in the medical record CGM is medically necessary for this pt. - CGM will improve/maintain A1c - CGM will reduce hypoglycemic events Rosas and Dexcom each require one test strip daily to use in case of sensor failure or for blood glucose verification or during sensor warmup.

## 2024-09-30 ENCOUNTER — NON-APPOINTMENT (OUTPATIENT)
Age: 69
End: 2024-09-30

## 2024-11-11 ENCOUNTER — ASC (OUTPATIENT)
Dept: URBAN - METROPOLITAN AREA SURGERY 8 | Facility: SURGERY | Age: 69
Setting detail: OPHTHALMOLOGY
End: 2024-11-11
Payer: MEDICARE

## 2024-11-11 ENCOUNTER — OFFICE (OUTPATIENT)
Dept: URBAN - METROPOLITAN AREA CLINIC 94 | Facility: CLINIC | Age: 69
Setting detail: OPHTHALMOLOGY
End: 2024-11-11
Payer: MEDICARE

## 2024-11-11 DIAGNOSIS — E11.3513: ICD-10-CM

## 2024-11-11 DIAGNOSIS — E11.3511: ICD-10-CM

## 2024-11-11 PROCEDURE — 92014 COMPRE OPH EXAM EST PT 1/>: CPT | Mod: 57 | Performed by: SPECIALIST

## 2024-11-11 PROCEDURE — 92134 CPTRZ OPH DX IMG PST SGM RTA: CPT | Performed by: SPECIALIST

## 2024-11-11 PROCEDURE — 67210 TREATMENT OF RETINAL LESION: CPT | Mod: RT | Performed by: SPECIALIST

## 2024-11-11 PROCEDURE — 92235 FLUORESCEIN ANGRPH MLTIFRAME: CPT | Performed by: SPECIALIST

## 2024-11-11 ASSESSMENT — CONFRONTATIONAL VISUAL FIELD TEST (CVF)
OS_FINDINGS: FULL
OD_FINDINGS: FULL

## 2024-11-11 ASSESSMENT — KERATOMETRY
OS_AXISANGLE_DEGREES: 018
METHOD_AUTO_MANUAL: AUTO
OD_K2POWER_DIOPTERS: 43.50
OD_AXISANGLE_DEGREES: 005
OS_K1POWER_DIOPTERS: 42.75
OD_K1POWER_DIOPTERS: 43.00
OS_K2POWER_DIOPTERS: 43.25

## 2024-11-11 ASSESSMENT — REFRACTION_AUTOREFRACTION
OD_SPHERE: +0.25
OS_SPHERE: +0.25
OS_AXIS: 090
OD_CYLINDER: -1.00
OD_AXIS: 096
OS_CYLINDER: -0.75

## 2024-11-11 ASSESSMENT — TONOMETRY
OS_IOP_MMHG: 18
OD_IOP_MMHG: 18

## 2024-11-11 ASSESSMENT — VISUAL ACUITY
OS_BCVA: 20/25
OD_BCVA: 20/25

## 2024-12-02 ENCOUNTER — ASC (OUTPATIENT)
Dept: URBAN - METROPOLITAN AREA SURGERY 8 | Facility: SURGERY | Age: 69
Setting detail: OPHTHALMOLOGY
End: 2024-12-02
Payer: MEDICARE

## 2024-12-02 DIAGNOSIS — E11.3512: ICD-10-CM

## 2024-12-02 PROCEDURE — 67210 TREATMENT OF RETINAL LESION: CPT | Mod: 79,LT | Performed by: SPECIALIST

## 2024-12-02 ASSESSMENT — CONFRONTATIONAL VISUAL FIELD TEST (CVF)
OD_FINDINGS: FULL
OS_FINDINGS: FULL

## 2024-12-02 ASSESSMENT — REFRACTION_AUTOREFRACTION
OD_AXIS: 096
OD_SPHERE: +0.25
OS_SPHERE: +0.25
OD_CYLINDER: -1.00
OS_AXIS: 090
OS_CYLINDER: -0.75

## 2024-12-02 ASSESSMENT — KERATOMETRY
OD_K1POWER_DIOPTERS: 43.00
OS_K2POWER_DIOPTERS: 43.25
OD_K2POWER_DIOPTERS: 43.50
OD_AXISANGLE_DEGREES: 005
OS_AXISANGLE_DEGREES: 018
METHOD_AUTO_MANUAL: AUTO
OS_K1POWER_DIOPTERS: 42.75

## 2024-12-02 ASSESSMENT — VISUAL ACUITY
OD_BCVA: 20/25
OS_BCVA: 20/30

## 2024-12-02 ASSESSMENT — TONOMETRY
OD_IOP_MMHG: 17
OS_IOP_MMHG: 17

## 2024-12-06 LAB
HBA1C MFR BLD HPLC: 7.5
LDLC SERPL DIRECT ASSAY-MCNC: 73
MICROALBUMIN/CREAT 24H UR-RTO: 17

## 2024-12-09 ENCOUNTER — APPOINTMENT (OUTPATIENT)
Dept: ENDOCRINOLOGY | Facility: CLINIC | Age: 69
End: 2024-12-09
Payer: MEDICARE

## 2024-12-09 VITALS
OXYGEN SATURATION: 97 % | SYSTOLIC BLOOD PRESSURE: 122 MMHG | BODY MASS INDEX: 22.12 KG/M2 | DIASTOLIC BLOOD PRESSURE: 60 MMHG | WEIGHT: 158 LBS | HEIGHT: 71 IN | HEART RATE: 104 BPM

## 2024-12-09 DIAGNOSIS — I10 ESSENTIAL (PRIMARY) HYPERTENSION: ICD-10-CM

## 2024-12-09 DIAGNOSIS — E78.5 HYPERLIPIDEMIA, UNSPECIFIED: ICD-10-CM

## 2024-12-09 DIAGNOSIS — E55.9 VITAMIN D DEFICIENCY, UNSPECIFIED: ICD-10-CM

## 2024-12-09 DIAGNOSIS — E10.65 TYPE 1 DIABETES MELLITUS WITH HYPERGLYCEMIA: ICD-10-CM

## 2024-12-09 LAB — GLUCOSE BLDC GLUCOMTR-MCNC: 205

## 2024-12-09 PROCEDURE — 95251 CONT GLUC MNTR ANALYSIS I&R: CPT

## 2024-12-09 PROCEDURE — 82962 GLUCOSE BLOOD TEST: CPT

## 2024-12-09 PROCEDURE — G2211 COMPLEX E/M VISIT ADD ON: CPT

## 2024-12-09 PROCEDURE — 99214 OFFICE O/P EST MOD 30 MIN: CPT

## 2024-12-31 ENCOUNTER — APPOINTMENT (OUTPATIENT)
Dept: ENDOCRINOLOGY | Facility: CLINIC | Age: 69
End: 2024-12-31
Payer: MEDICARE

## 2024-12-31 DIAGNOSIS — E10.65 TYPE 1 DIABETES MELLITUS WITH HYPERGLYCEMIA: ICD-10-CM

## 2024-12-31 PROCEDURE — G0108 DIAB MANAGE TRN  PER INDIV: CPT

## 2025-01-08 RX ORDER — INSULIN LISPRO 100 [IU]/ML
100 INJECTION, SOLUTION INTRAVENOUS; SUBCUTANEOUS
Qty: 5 | Refills: 3 | Status: ACTIVE | COMMUNITY
Start: 2025-01-08 | End: 1900-01-01

## 2025-01-15 RX ORDER — INSULIN PMP CART,AUT,G6/7,CNTR
EACH SUBCUTANEOUS
Qty: 1 | Refills: 0 | Status: ACTIVE | COMMUNITY
Start: 2025-01-06

## 2025-01-15 RX ORDER — INSULIN PMP CART,AUT,G6/7,CNTR
EACH SUBCUTANEOUS
Qty: 2 | Refills: 5 | Status: ACTIVE | COMMUNITY
Start: 2025-01-06

## 2025-01-20 ENCOUNTER — APPOINTMENT (OUTPATIENT)
Dept: ENDOCRINOLOGY | Facility: CLINIC | Age: 70
End: 2025-01-20
Payer: MEDICARE

## 2025-01-20 DIAGNOSIS — E10.65 TYPE 1 DIABETES MELLITUS WITH HYPERGLYCEMIA: ICD-10-CM

## 2025-01-20 PROCEDURE — G0108 DIAB MANAGE TRN  PER INDIV: CPT

## 2025-02-11 ENCOUNTER — APPOINTMENT (OUTPATIENT)
Dept: ENDOCRINOLOGY | Facility: CLINIC | Age: 70
End: 2025-02-11
Payer: COMMERCIAL

## 2025-02-11 DIAGNOSIS — E10.65 TYPE 1 DIABETES MELLITUS WITH HYPERGLYCEMIA: ICD-10-CM

## 2025-02-11 PROCEDURE — P0019: CPT

## 2025-02-25 ENCOUNTER — APPOINTMENT (OUTPATIENT)
Dept: ENDOCRINOLOGY | Facility: CLINIC | Age: 70
End: 2025-02-25
Payer: MEDICARE

## 2025-02-25 DIAGNOSIS — E10.65 TYPE 1 DIABETES MELLITUS WITH HYPERGLYCEMIA: ICD-10-CM

## 2025-02-25 PROCEDURE — G0108 DIAB MANAGE TRN  PER INDIV: CPT

## 2025-03-10 ENCOUNTER — OFFICE (OUTPATIENT)
Dept: URBAN - METROPOLITAN AREA CLINIC 94 | Facility: CLINIC | Age: 70
Setting detail: OPHTHALMOLOGY
End: 2025-03-10
Payer: MEDICARE

## 2025-03-10 ENCOUNTER — ASC (OUTPATIENT)
Dept: URBAN - METROPOLITAN AREA SURGERY 8 | Facility: SURGERY | Age: 70
Setting detail: OPHTHALMOLOGY
End: 2025-03-10
Payer: MEDICARE

## 2025-03-10 DIAGNOSIS — E11.3513: ICD-10-CM

## 2025-03-10 DIAGNOSIS — E11.3511: ICD-10-CM

## 2025-03-10 PROCEDURE — 67210 TREATMENT OF RETINAL LESION: CPT | Mod: RT | Performed by: SPECIALIST

## 2025-03-10 PROCEDURE — 92134 CPTRZ OPH DX IMG PST SGM RTA: CPT | Performed by: SPECIALIST

## 2025-03-10 PROCEDURE — 92235 FLUORESCEIN ANGRPH MLTIFRAME: CPT | Performed by: SPECIALIST

## 2025-03-10 PROCEDURE — 92014 COMPRE OPH EXAM EST PT 1/>: CPT | Mod: 57 | Performed by: SPECIALIST

## 2025-03-10 ASSESSMENT — REFRACTION_AUTOREFRACTION
OD_CYLINDER: -1.00
OD_AXIS: 096
OS_SPHERE: +0.25
OD_SPHERE: +0.25
OS_CYLINDER: -0.75
OS_AXIS: 090

## 2025-03-10 ASSESSMENT — KERATOMETRY
METHOD_AUTO_MANUAL: AUTO
OD_K2POWER_DIOPTERS: 43.50
OS_K1POWER_DIOPTERS: 42.75
OD_K1POWER_DIOPTERS: 43.00
OS_AXISANGLE_DEGREES: 018
OS_K2POWER_DIOPTERS: 43.25
OD_AXISANGLE_DEGREES: 005

## 2025-03-10 ASSESSMENT — TONOMETRY
OS_IOP_MMHG: 17
OD_IOP_MMHG: 17

## 2025-03-10 ASSESSMENT — VISUAL ACUITY
OD_BCVA: 20/25
OS_BCVA: 20/30

## 2025-03-10 ASSESSMENT — CONFRONTATIONAL VISUAL FIELD TEST (CVF)
OS_FINDINGS: FULL
OD_FINDINGS: FULL

## 2025-03-24 ENCOUNTER — ASC (OUTPATIENT)
Dept: URBAN - METROPOLITAN AREA SURGERY 8 | Facility: SURGERY | Age: 70
Setting detail: OPHTHALMOLOGY
End: 2025-03-24
Payer: MEDICARE

## 2025-03-24 DIAGNOSIS — E11.3513: ICD-10-CM

## 2025-03-24 DIAGNOSIS — E11.3512: ICD-10-CM

## 2025-03-24 DIAGNOSIS — E11.3511: ICD-10-CM

## 2025-03-24 LAB
HBA1C MFR BLD HPLC: 8.6
MICROALBUMIN/CREAT 24H UR-RTO: 21

## 2025-03-24 PROCEDURE — 67210 TREATMENT OF RETINAL LESION: CPT | Mod: 79,LT | Performed by: SPECIALIST

## 2025-03-24 PROCEDURE — 99024 POSTOP FOLLOW-UP VISIT: CPT | Performed by: SPECIALIST

## 2025-03-24 ASSESSMENT — TONOMETRY
OS_IOP_MMHG: 14
OD_IOP_MMHG: 18

## 2025-03-24 ASSESSMENT — KERATOMETRY
OD_AXISANGLE_DEGREES: 005
OD_K2POWER_DIOPTERS: 43.50
OS_K2POWER_DIOPTERS: 43.25
OD_K1POWER_DIOPTERS: 43.00
OS_AXISANGLE_DEGREES: 018
METHOD_AUTO_MANUAL: AUTO
OS_K1POWER_DIOPTERS: 42.75

## 2025-03-24 ASSESSMENT — REFRACTION_AUTOREFRACTION
OS_AXIS: 090
OS_SPHERE: +0.25
OD_CYLINDER: -1.00
OS_CYLINDER: -0.75
OD_AXIS: 096
OD_SPHERE: +0.25

## 2025-03-24 ASSESSMENT — CONFRONTATIONAL VISUAL FIELD TEST (CVF)
OD_FINDINGS: FULL
OS_FINDINGS: FULL

## 2025-03-24 ASSESSMENT — VISUAL ACUITY
OS_BCVA: 20/25-1
OD_BCVA: 20/25-1

## 2025-03-25 ENCOUNTER — APPOINTMENT (OUTPATIENT)
Dept: ENDOCRINOLOGY | Facility: CLINIC | Age: 70
End: 2025-03-25
Payer: MEDICARE

## 2025-03-25 VITALS
BODY MASS INDEX: 21.14 KG/M2 | HEART RATE: 96 BPM | OXYGEN SATURATION: 98 % | DIASTOLIC BLOOD PRESSURE: 58 MMHG | SYSTOLIC BLOOD PRESSURE: 100 MMHG | WEIGHT: 151 LBS | HEIGHT: 71 IN

## 2025-03-25 DIAGNOSIS — Z46.81 ENCOUNTER FOR FITTING AND ADJUSTMENT OF INSULIN PUMP: ICD-10-CM

## 2025-03-25 DIAGNOSIS — E10.649 TYPE 1 DIABETES MELLITUS WITH HYPOGLYCEMIA W/OUT COMA: ICD-10-CM

## 2025-03-25 DIAGNOSIS — E55.9 VITAMIN D DEFICIENCY, UNSPECIFIED: ICD-10-CM

## 2025-03-25 DIAGNOSIS — I10 ESSENTIAL (PRIMARY) HYPERTENSION: ICD-10-CM

## 2025-03-25 DIAGNOSIS — E10.65 TYPE 1 DIABETES MELLITUS WITH HYPERGLYCEMIA: ICD-10-CM

## 2025-03-25 DIAGNOSIS — Z96.41 PRESENCE OF INSULIN PUMP (EXTERNAL) (INTERNAL): ICD-10-CM

## 2025-03-25 DIAGNOSIS — E78.5 HYPERLIPIDEMIA, UNSPECIFIED: ICD-10-CM

## 2025-03-25 LAB — GLUCOSE BLDC GLUCOMTR-MCNC: 261

## 2025-03-25 PROCEDURE — G2211 COMPLEX E/M VISIT ADD ON: CPT

## 2025-03-25 PROCEDURE — 95251 CONT GLUC MNTR ANALYSIS I&R: CPT

## 2025-03-25 PROCEDURE — 99215 OFFICE O/P EST HI 40 MIN: CPT

## 2025-03-25 PROCEDURE — 82962 GLUCOSE BLOOD TEST: CPT

## 2025-05-06 ENCOUNTER — APPOINTMENT (OUTPATIENT)
Dept: ENDOCRINOLOGY | Facility: CLINIC | Age: 70
End: 2025-05-06
Payer: MEDICARE

## 2025-05-06 VITALS — WEIGHT: 151 LBS | BODY MASS INDEX: 21.14 KG/M2 | HEIGHT: 71 IN

## 2025-05-06 DIAGNOSIS — E10.65 TYPE 1 DIABETES MELLITUS WITH HYPERGLYCEMIA: ICD-10-CM

## 2025-05-06 PROCEDURE — G0108 DIAB MANAGE TRN  PER INDIV: CPT

## 2025-05-07 RX ORDER — GLUCAGON INJECTION, SOLUTION 1 MG/.2ML
1 INJECTION, SOLUTION SUBCUTANEOUS
Qty: 2 | Refills: 2 | Status: ACTIVE | COMMUNITY
Start: 2025-05-07

## 2025-06-09 ENCOUNTER — APPOINTMENT (OUTPATIENT)
Dept: ENDOCRINOLOGY | Facility: CLINIC | Age: 70
End: 2025-06-09
Payer: MEDICARE

## 2025-06-09 PROCEDURE — G0108 DIAB MANAGE TRN  PER INDIV: CPT

## 2025-06-25 ENCOUNTER — RX RENEWAL (OUTPATIENT)
Age: 70
End: 2025-06-25

## 2025-07-07 ENCOUNTER — APPOINTMENT (OUTPATIENT)
Dept: ENDOCRINOLOGY | Facility: CLINIC | Age: 70
End: 2025-07-07
Payer: MEDICARE

## 2025-07-07 ENCOUNTER — NON-APPOINTMENT (OUTPATIENT)
Age: 70
End: 2025-07-07

## 2025-07-07 VITALS
DIASTOLIC BLOOD PRESSURE: 64 MMHG | BODY MASS INDEX: 20.86 KG/M2 | HEART RATE: 89 BPM | HEIGHT: 71 IN | WEIGHT: 149 LBS | SYSTOLIC BLOOD PRESSURE: 120 MMHG | OXYGEN SATURATION: 98 %

## 2025-07-07 LAB — GLUCOSE BLDC GLUCOMTR-MCNC: 191

## 2025-07-07 PROCEDURE — G2211 COMPLEX E/M VISIT ADD ON: CPT

## 2025-07-07 PROCEDURE — 82962 GLUCOSE BLOOD TEST: CPT

## 2025-07-07 PROCEDURE — 95251 CONT GLUC MNTR ANALYSIS I&R: CPT

## 2025-07-07 PROCEDURE — 99214 OFFICE O/P EST MOD 30 MIN: CPT

## 2025-07-14 ENCOUNTER — ASC (OUTPATIENT)
Dept: URBAN - METROPOLITAN AREA SURGERY 8 | Facility: SURGERY | Age: 70
Setting detail: OPHTHALMOLOGY
End: 2025-07-14
Payer: MEDICARE

## 2025-07-14 ENCOUNTER — OFFICE (OUTPATIENT)
Dept: URBAN - METROPOLITAN AREA CLINIC 94 | Facility: CLINIC | Age: 70
Setting detail: OPHTHALMOLOGY
End: 2025-07-14
Payer: MEDICARE

## 2025-07-14 DIAGNOSIS — E11.3511: ICD-10-CM

## 2025-07-14 DIAGNOSIS — E11.3512: ICD-10-CM

## 2025-07-14 DIAGNOSIS — E11.3513: ICD-10-CM

## 2025-07-14 PROCEDURE — 92235 FLUORESCEIN ANGRPH MLTIFRAME: CPT | Performed by: SPECIALIST

## 2025-07-14 PROCEDURE — 67210 TREATMENT OF RETINAL LESION: CPT | Mod: RT | Performed by: SPECIALIST

## 2025-07-14 PROCEDURE — 92012 INTRM OPH EXAM EST PATIENT: CPT | Mod: 57 | Performed by: SPECIALIST

## 2025-07-14 PROCEDURE — 92134 CPTRZ OPH DX IMG PST SGM RTA: CPT | Performed by: SPECIALIST

## 2025-07-14 ASSESSMENT — CONFRONTATIONAL VISUAL FIELD TEST (CVF)
OD_FINDINGS: FULL
OS_FINDINGS: FULL

## 2025-07-14 ASSESSMENT — VISUAL ACUITY
OD_BCVA: 20/30-1
OS_BCVA: 20/30

## 2025-07-14 ASSESSMENT — REFRACTION_AUTOREFRACTION
OS_AXIS: 090
OD_CYLINDER: -1.00
OD_AXIS: 096
OD_SPHERE: +0.25
OS_CYLINDER: -0.75
OS_SPHERE: +0.25

## 2025-07-14 ASSESSMENT — KERATOMETRY
OS_AXISANGLE_DEGREES: 018
OD_K1POWER_DIOPTERS: 43.00
OD_K2POWER_DIOPTERS: 43.50
OS_K1POWER_DIOPTERS: 42.75
OS_K2POWER_DIOPTERS: 43.25
METHOD_AUTO_MANUAL: AUTO
OD_AXISANGLE_DEGREES: 005

## 2025-07-14 ASSESSMENT — TONOMETRY
OD_IOP_MMHG: 14
OS_IOP_MMHG: 15

## 2025-08-04 ENCOUNTER — ASC (OUTPATIENT)
Dept: URBAN - METROPOLITAN AREA SURGERY 8 | Facility: SURGERY | Age: 70
Setting detail: OPHTHALMOLOGY
End: 2025-08-04
Payer: MEDICARE

## 2025-08-04 DIAGNOSIS — E11.3512: ICD-10-CM

## 2025-08-04 PROCEDURE — 67210 TREATMENT OF RETINAL LESION: CPT | Mod: 79,LT | Performed by: SPECIALIST

## 2025-08-04 ASSESSMENT — REFRACTION_AUTOREFRACTION
OD_AXIS: 096
OD_SPHERE: +0.25
OD_CYLINDER: -1.00
OS_SPHERE: +0.25
OS_AXIS: 090
OS_CYLINDER: -0.75

## 2025-08-04 ASSESSMENT — TONOMETRY
OS_IOP_MMHG: 13
OD_IOP_MMHG: 13

## 2025-08-04 ASSESSMENT — CONFRONTATIONAL VISUAL FIELD TEST (CVF)
OD_FINDINGS: FULL
OS_FINDINGS: FULL

## 2025-08-04 ASSESSMENT — VISUAL ACUITY
OD_BCVA: 20/30
OS_BCVA: 20/30

## 2025-08-04 ASSESSMENT — KERATOMETRY
OD_K1POWER_DIOPTERS: 43.00
OD_K2POWER_DIOPTERS: 43.50
OS_K1POWER_DIOPTERS: 42.75
OD_AXISANGLE_DEGREES: 005
OS_AXISANGLE_DEGREES: 018
METHOD_AUTO_MANUAL: AUTO
OS_K2POWER_DIOPTERS: 43.25